# Patient Record
Sex: FEMALE | Race: WHITE | NOT HISPANIC OR LATINO | Employment: FULL TIME | URBAN - METROPOLITAN AREA
[De-identification: names, ages, dates, MRNs, and addresses within clinical notes are randomized per-mention and may not be internally consistent; named-entity substitution may affect disease eponyms.]

---

## 2017-01-31 ENCOUNTER — OFFICE VISIT (OUTPATIENT)
Dept: OBSTETRICS AND GYNECOLOGY | Facility: CLINIC | Age: 52
End: 2017-01-31

## 2017-01-31 VITALS
HEIGHT: 67 IN | SYSTOLIC BLOOD PRESSURE: 120 MMHG | BODY MASS INDEX: 43.35 KG/M2 | WEIGHT: 276.2 LBS | DIASTOLIC BLOOD PRESSURE: 82 MMHG

## 2017-01-31 DIAGNOSIS — N60.12 FIBROCYSTIC BREAST CHANGES, BILATERAL: Primary | ICD-10-CM

## 2017-01-31 DIAGNOSIS — N60.11 FIBROCYSTIC BREAST CHANGES, BILATERAL: Primary | ICD-10-CM

## 2017-01-31 DIAGNOSIS — E66.01 OBESITY, CLASS III, BMI 40-49.9 (MORBID OBESITY) (HCC): ICD-10-CM

## 2017-01-31 DIAGNOSIS — Z01.419 ENCOUNTER FOR GYNECOLOGICAL EXAMINATION WITHOUT ABNORMAL FINDING: ICD-10-CM

## 2017-01-31 PROBLEM — E66.813 OBESITY, CLASS III, BMI 40-49.9 (MORBID OBESITY): Status: ACTIVE | Noted: 2017-01-31

## 2017-01-31 PROCEDURE — 99396 PREV VISIT EST AGE 40-64: CPT | Performed by: OBSTETRICS & GYNECOLOGY

## 2017-01-31 RX ORDER — MONTELUKAST SODIUM 10 MG/1
1 TABLET ORAL DAILY
COMMUNITY
Start: 2017-01-06

## 2017-01-31 RX ORDER — OMEPRAZOLE 20 MG/1
20 CAPSULE, DELAYED RELEASE ORAL DAILY
COMMUNITY

## 2017-01-31 RX ORDER — HYDROCHLOROTHIAZIDE 12.5 MG/1
1 CAPSULE, GELATIN COATED ORAL DAILY
COMMUNITY
Start: 2016-12-19 | End: 2018-07-19 | Stop reason: DRUGHIGH

## 2017-01-31 NOTE — MR AVS SNAPSHOT
Anahy Osullivanby   2017 4:00 PM   Office Visit    Dept Phone:  [phone]   Encounter #:  [id number]    Provider:  Rob Fenton MD   Department:  [facility]                Your Full Care Plan              Your Updated Medication List          This list is accurate as of: 17  4:34 PM.  Always use your most recent med list.                hydrochlorothiazide 12.5 MG capsule   Commonly known as:  MICROZIDE       montelukast 10 MG tablet   Commonly known as:  SINGULAIR       MULTIVITAMIN ADULT PO       omeprazole 20 MG capsule   Commonly known as:  priLOSEC               We Performed the Following     Liquid-based Pap Smear, Screening       You Were Diagnosed With        Codes Comments    Fibrocystic breast changes, bilateral    -  Primary ICD-10-CM: N60.11, N60.12  ICD-9-CM: 610.1     Obesity, Class III, BMI 40-49.9 (morbid obesity)     ICD-10-CM: E66.01  ICD-9-CM: 278.01     Encounter for gynecological examination without abnormal finding     ICD-10-CM: Z01.419  ICD-9-CM: V72.31       Instructions     None    Patient Instructions History      Upcoming Appointments     Visit Type Date Time Department    ANNUAL 2017  4:00 PM [facility]      [employer] Signup     [facility] allows you to send messages to your doctor, view your test results, renew your prescriptions, schedule appointments, and more. To sign up, go to [employer] and click on the Sign Up Now link in the New User? box. Enter your [employer] Activation Code exactly as it appears below along with the last four digits of your Social Security Number and your Date of Birth () to complete the sign-up process. If you do not sign up before the expiration date, you must request a new code.    [employer] Activation Code: [email or web address]  Expires: 2017  4:34 PM    If you have questions, you can email [email or web address] or call  "[phone] to talk to our MyChart staff. Remember, MyChart is NOT to be used for urgent needs. For medical emergencies, dial 911.               Other Info from Your Visit           Your Appointments     Feb 06, 2018  4:00 PM EST   Annual with Rob Fenton MD   [facility] [place] (--)    [address]  [place] [facility] [address] [phone]              Allergies     Lortab [Hydrocodone-acetaminophen]  Nausea And Vomiting    Sulfa Antibiotics  Nausea And Vomiting      Reason for Visit     Gynecologic Exam           Vital Signs     Blood Pressure Height Weight Last Menstrual Period Body Mass Index Smoking Status    120/82 66.5\" (168.9 cm) 276 lb 3.2 oz (125 kg) 01/24/2017 (Exact Date) 43.91 kg/m2 Never Smoker      Problems and Diagnoses Noted     Painful lumpy breasts    High blood pressure    Obesity, Class III, BMI 40-49.9 (morbid obesity)    Seasonal allergic reaction    Encounter for routine gynecological examination            "

## 2017-01-31 NOTE — PROGRESS NOTES
"   Chief Complaint   Patient presents with   • Gynecologic Exam       Anahy Salinas is a 51 y.o. year old  presenting to be seen for her annual wellness exam.  She has been seen by Dr. De Guzman.  She has previously had a  section and tubal sterilization.  Prior to that she had a laparotomy with a uterine myomectomy.  She still has cyclic menses with variable flow.  She has no intermenstrual bleeding.  Rest imaging has been benign.    SCREENING TESTS    Year 2012   Age                         PAP    Neg.                     HPV high risk                         Mammogram    benign benign                    BONNY score                         Breast MRI                         Lipids                         Vitamin D                         Colonoscopy                         DEXA  Frax (hip/any)                         Ovarian Screen                           Enter the month test was performed.  If month not known, enter \"X'  · Black numbers = normal results  · Red numbers = abnormal results  · Black X = patient reported normal  · Red X - patient reported abnormal      Referred by:    Profession:    Other info:         History   Sexual Activity   • Sexual activity: Yes   • Partners: Male   • Birth control/ protection: Surgical    She would not like to be screened for STD's at today's exam.     She exercises regularly: no.  She wears her seat belt: yes.  She has concerns about domestic violence: no.  She has noticed changes in height: no    GYN screening history:  · Last pap: was done on approximately 8/15/2015 and the result was: normal PAP.  · Last mammogram: was done on approximately 2016 and the result was: Birads I (Normal)..    No Additional Complaints Reported    The following portions of the patient's history were reviewed and updated as appropriate:vital signs and   She  does not have any " "pertinent problems on file.  She  has a past surgical history that includes Reduction mammaplasty (Bilateral); Tubal ligation;  section; Myomectomy; Cervical polypectomy; Tonsillectomy; and Dilation and curettage of uterus.  Her family history includes Breast cancer (age of onset: 28) in her cousin. There is no history of Ovarian cancer.  She  reports that she has never smoked. She does not have any smokeless tobacco history on file. She reports that she does not drink alcohol or use illicit drugs.  Current Outpatient Prescriptions   Medication Sig Dispense Refill   • Multiple Vitamins-Minerals (MULTIVITAMIN ADULT PO) Take 1 tablet by mouth Daily.     • omeprazole (priLOSEC) 20 MG capsule Take 20 mg by mouth Daily.     • hydrochlorothiazide (MICROZIDE) 12.5 MG capsule Take 1 capsule by mouth Daily.     • montelukast (SINGULAIR) 10 MG tablet Take 1 tablet by mouth Daily.       No current facility-administered medications for this visit.      She is allergic to lortab [hydrocodone-acetaminophen] and sulfa antibiotics..    Review of Systems  A comprehensive review of systems was negative.  Constitutional: negative for fever, chills, activity change, appetite change, fatigue and unexpected weight change.  Respiratory: negative  Cardiovascular: negative  Gastrointestinal: negative  Genitourinary:negative  Musculoskeletal:negative  Behavioral/Psych: negative       Visit Vitals   • /82   • Ht 66.5\" (168.9 cm)   • Wt 276 lb 3.2 oz (125 kg)   • LMP 2017 (Exact Date)   • BMI 43.91 kg/m2       Physical Exam    General:  alert; cooperative; well developed; well nourished  obese - Body mass index is 43.91 kg/(m^2).   Skin:  No suspicious lesions seen   Thyroid: normal to inspection and palpation   Lungs:  clear to auscultation bilaterally   Heart:  regular rate and rhythm, S1, S2 normal, no murmur, click, rub or gallop   Breasts:  Examined in supine position  Symmetric without masses or skin dimpling  Nipples " normal without inversion, lesions or discharge  There are no palpable axillary nodes  Fibrocystic changes are present both breasts without a discrete mass  scars   Abdomen: soft, non-tender; no masses  no umbilical or inginual hernias are present  no hepato-splenomegaly   Pelvis: Clinical staff was present for exam  External genitalia:  normal appearance of the external genitalia including Bartholin's and [place]'s glands.  Vaginal:  normal pink mucosa without prolapse or lesions.  Cervix:  normal appearance.  Uterus:  normal size, shape and consistency. anteverted;  Adnexa:  non palpable bilaterally.  Rectal:  anus visually normal appearing. recto-vaginal exam unremarkable and confirms findings;     Lab Review   No data reviewed    Imaging  Mammogram results         ASSESSMENT  Problems Addressed this Visit        Digestive    Obesity, Class III, BMI 40-49.9 (morbid obesity)       Other    Fibrocystic breast changes, bilateral - Primary      Other Visit Diagnoses     Encounter for gynecological examination without abnormal finding        Relevant Orders    Liquid-based Pap Smear, Screening          PLAN    Medications prescribed this encounter:    New Medications Ordered This Visit   Medications   • hydrochlorothiazide (MICROZIDE) 12.5 MG capsule     Sig: Take 1 capsule by mouth Daily.   • montelukast (SINGULAIR) 10 MG tablet     Sig: Take 1 tablet by mouth Daily.   • Multiple Vitamins-Minerals (MULTIVITAMIN ADULT PO)     Sig: Take 1 tablet by mouth Daily.   • omeprazole (priLOSEC) 20 MG capsule     Sig: Take 20 mg by mouth Daily.   ·   · Calcium, 600 mg/ Vit. D, 400 IU daily; regular weight-bearing exercise  · Follow up: 12 month(s)  *Please note that portions of this documentation may have been completed with a voice recognition program.  Efforts were made to edit this dictation, but occasional words may have been mistranscribed.       This note was electronically signed.    BOLIVAR Fenton MD  January 31,  2017  4:29 PM

## 2017-07-06 ENCOUNTER — TRANSCRIBE ORDERS (OUTPATIENT)
Dept: OBSTETRICS AND GYNECOLOGY | Facility: CLINIC | Age: 52
End: 2017-07-06

## 2017-07-06 DIAGNOSIS — Z12.31 VISIT FOR SCREENING MAMMOGRAM: Primary | ICD-10-CM

## 2017-07-11 ENCOUNTER — APPOINTMENT (OUTPATIENT)
Dept: MAMMOGRAPHY | Facility: HOSPITAL | Age: 52
End: 2017-07-11
Attending: OBSTETRICS & GYNECOLOGY

## 2017-07-11 ENCOUNTER — HOSPITAL ENCOUNTER (OUTPATIENT)
Dept: MAMMOGRAPHY | Facility: HOSPITAL | Age: 52
Discharge: HOME | End: 2017-07-11
Attending: OBSTETRICS & GYNECOLOGY | Admitting: OBSTETRICS & GYNECOLOGY

## 2017-07-11 DIAGNOSIS — Z12.31 VISIT FOR SCREENING MAMMOGRAM: ICD-10-CM

## 2017-07-11 PROCEDURE — 77067 SCR MAMMO BI INCL CAD: CPT | Performed by: RADIOLOGY

## 2017-07-11 PROCEDURE — 77063 BREAST TOMOSYNTHESIS BI: CPT | Performed by: RADIOLOGY

## 2017-07-11 PROCEDURE — G0202 SCR MAMMO BI INCL CAD: HCPCS

## 2017-07-11 PROCEDURE — 77063 BREAST TOMOSYNTHESIS BI: CPT

## 2018-06-25 ENCOUNTER — TRANSCRIBE ORDERS (OUTPATIENT)
Dept: ADMINISTRATIVE | Facility: HOSPITAL | Age: 53
End: 2018-06-25

## 2018-06-25 DIAGNOSIS — Z12.31 VISIT FOR SCREENING MAMMOGRAM: Primary | ICD-10-CM

## 2018-07-12 ENCOUNTER — HOSPITAL ENCOUNTER (OUTPATIENT)
Dept: MAMMOGRAPHY | Facility: HOSPITAL | Age: 53
Discharge: HOME | End: 2018-07-12
Attending: OBSTETRICS & GYNECOLOGY | Admitting: OBSTETRICS & GYNECOLOGY

## 2018-07-12 DIAGNOSIS — Z12.31 VISIT FOR SCREENING MAMMOGRAM: ICD-10-CM

## 2018-07-12 PROBLEM — Z01.419 WELL WOMAN EXAM: Status: ACTIVE | Noted: 2018-07-12

## 2018-07-12 PROCEDURE — 77063 BREAST TOMOSYNTHESIS BI: CPT | Performed by: RADIOLOGY

## 2018-07-12 PROCEDURE — 77067 SCR MAMMO BI INCL CAD: CPT | Performed by: RADIOLOGY

## 2018-07-12 PROCEDURE — 77067 SCR MAMMO BI INCL CAD: CPT

## 2018-07-12 PROCEDURE — 77063 BREAST TOMOSYNTHESIS BI: CPT

## 2018-07-19 ENCOUNTER — OFFICE VISIT (OUTPATIENT)
Dept: OBSTETRICS AND GYNECOLOGY | Facility: CLINIC | Age: 53
End: 2018-07-19

## 2018-07-19 VITALS
WEIGHT: 278 LBS | SYSTOLIC BLOOD PRESSURE: 122 MMHG | DIASTOLIC BLOOD PRESSURE: 80 MMHG | RESPIRATION RATE: 14 BRPM | BODY MASS INDEX: 44.2 KG/M2

## 2018-07-19 DIAGNOSIS — Z01.419 WELL WOMAN EXAM: Primary | ICD-10-CM

## 2018-07-19 DIAGNOSIS — N95.0 POSTMENOPAUSAL BLEEDING: ICD-10-CM

## 2018-07-19 DIAGNOSIS — Z12.11 SCREEN FOR COLON CANCER: ICD-10-CM

## 2018-07-19 PROBLEM — R73.03 PRE-DIABETES: Status: ACTIVE | Noted: 2018-01-01

## 2018-07-19 PROCEDURE — 99396 PREV VISIT EST AGE 40-64: CPT | Performed by: OBSTETRICS & GYNECOLOGY

## 2018-07-19 RX ORDER — HYDROCHLOROTHIAZIDE 25 MG/1
TABLET ORAL
COMMUNITY

## 2018-07-19 NOTE — PROGRESS NOTES
Subjective   Chief Complaint   Patient presents with   • Gynecologic Exam     Anahy Schaffer is a 53 y.o. year old  presenting to be seen for her annual exam.     SEXUAL Hx:  She is currently sexually active.  In the past year there there has been NO new sexual partners.    Condoms are never used.  She would not like to be screened for STD's at today's exam.  Current birth control method: tubal ligation.  She is happy with her current method of contraception and does not want to discuss alternative methods of contraception.  MENSTRUAL Hx:  Patient's last menstrual period was 2018.  This was the first cycle in at least 6 months  In the past 6 months her cycles have been unpredictable infrequent.  Her menstrual flow is typically normal.     Intermenstrual bleeding is absent.    Post-coital bleeding is absent.  Dysmenorrhea: is not affecting her activities of daily living  PMS: is not affecting her activities of daily living  Her cycles are not a source of concern for her that she wishes to discuss today.  HEALTH Hx:  She exercises regularly: no (but is planning to start exercising more ).  She wears her seat belt: yes.  She has concerns about domestic violence: no.  OTHER THINGS SHE WANTS TO DISCUSS TODAY:  Nothing else    The following portions of the patient's history were reviewed and updated as appropriate:problem list, current medications, allergies, past family history, past medical history, past social history and past surgical history.    Smoking status: Never Smoker                                                                 Smokeless tobacco: Not on file                       Review of Systems  Constitutional POS: nothing reported    NEG: anorexia or night sweats   Genitourinary POS: nothing reported    NEG: dysuria or hematuria      Gastointestinal POS: nothing reported    NEG: bloating, change in bowel habits, melena or reflux symptoms   Integument POS: nothing reported    NEG: moles  that are changing in size, shape, color or rashes   Breast POS: nothing reported    NEG: persistent breast lump, skin dimpling or nipple discharge        Objective   /80   Resp 14   Wt 126 kg (278 lb)   LMP 06/19/2018   Breastfeeding? No   BMI 44.20 kg/m²     General:  well developed; well nourished  no acute distress   Skin:  No suspicious lesions seen   Thyroid: normal to inspection and palpation   Breasts:  Examined in supine position  Symmetric without masses or skin dimpling  Nipples normal without inversion, lesions or discharge  There are no palpable axillary nodes   Abdomen: soft, non-tender; no masses  no umbilical or inginual hernias are present  no hepato-splenomegaly   Pelvis: Exam limited by  body habitus  Clinical staff was present for exam  External genitalia:  normal appearance of the external genitalia including Bartholin's and [place]'s glands.  :  urethral meatus normal;  Vaginal:  normal pink mucosa without prolapse or lesions.  Cervix:  normal appearance.  Uterus:  normal size, shape and consistency.  Adnexa:  non palpable bilaterally.  Rectal:  digital rectal exam not performed; anus visually normal appearing.        Assessment   1. Normal GYN exam  2. Perimenopausal bleeding.  This is a new finding that does need to be worked up further  3. She is up to date on all relevant gynecologic and colorectal screenings except colonoscopy     Plan   1. Pap was not done today.  I explained to Anahy that the recommendations for Pap smear interval in a low risk patient has lengthened to 3 years time.  I told Anahy she still needs to be seen in our office yearly for a full physical including breast and pelvic exam.  2. She was encouraged to get yearly mammograms.  She should report any palpable breast lump(s) or skin changes regardless of mammographic findings.  I explained to Anahy that notification regarding her mammogram results will come from the center performing the study.  Our  office will not be routinely calling with mammogram results.  It is her responsibility to make sure that the results from the mammogram are communicated to her by the breast center.  If she has any questions about the results, she is welcome to call our office anytime.  3. Colonoscopy was recommended for screening for colon cancer.  The procedure was briefly discussed and its benefits for early detection of colon cancer were emphasized.  I explained to Anahy that we could help her to schedule it if she wishes.  Additionally, she could also contact her primary care physician to help make this arrangement.  After considering these options she wants help setting up her colonoscopy.  Referral will be made to Dr. Dowd for outpatient colonoscopy..  4. Ultrasound needs to be done any time that is convenient for her.   5. The importance of keeping all planned follow-up and taking all medications as prescribed was emphasized.  6. Follow up after ultrasound            This note was electronically signed.    Usama Mosqueda M.D.  July 19, 2018    Note: Speech recognition transcription software may have been used to create portions of this document.  An attempt at proofreading has been made but errors in transcription could still be present.

## 2018-07-31 ENCOUNTER — LAB REQUISITION (OUTPATIENT)
Dept: LAB | Facility: HOSPITAL | Age: 53
End: 2018-07-31

## 2018-07-31 DIAGNOSIS — Z12.11 ENCOUNTER FOR SCREENING FOR MALIGNANT NEOPLASM OF COLON: ICD-10-CM

## 2018-07-31 PROCEDURE — 88305 TISSUE EXAM BY PATHOLOGIST: CPT | Performed by: INTERNAL MEDICINE

## 2018-08-01 LAB
CYTO UR: NORMAL
LAB AP CASE REPORT: NORMAL
LAB AP CLINICAL INFORMATION: NORMAL
PATH REPORT.FINAL DX SPEC: NORMAL
PATH REPORT.GROSS SPEC: NORMAL

## 2019-07-22 NOTE — PROGRESS NOTES
Subjective   Chief Complaint   Patient presents with   • Gynecologic Exam     Anahy Schaffer is a 54 y.o. year old  menopausal female presenting to be seen for her annual exam.  When she was last here she had some unpredictable bleeding for which an ultrasound was done.  Results were normal and she was told to observe this.  It is been at least 6 months since her last episode of bleeding.    Last year colonoscopy was recommended.  She got it done at [facility].  It was done by Dr. Dowd and she had a hyperplastic colon polyp.  She believes 10-year follow-up was recommended.    This past year she has not been on hormone replacement therapy.  She has not had any vaginal bleeding in the last 6 months.  Menopausal symptoms are not present.    SEXUAL Hx:  She is currently sexually active.  In the past year there there has been NO new sexual partners.    Condoms are never used.  She would not like to be screened for STD's at today's exam.  [place] is painful: no  HEALTH Hx:  She exercises regularly: no (and has no plans to become more active).  She wears her seat belt: yes.  She has concerns about domestic violence: no.  She has noticed changes in height: no.  OTHER THINGS SHE WANTS TO DISCUSS TODAY:  Nothing else    The following portions of the patient's history were reviewed and updated as appropriate:problem list, current medications, allergies, past family history, past medical history, past social history and past surgical history.    Social History    Tobacco Use      Smoking status: Never Smoker      Smokeless tobacco: Never Used      Review of Systems  Constitutional POS: nothing reported    NEG: anorexia or night sweats   Genitourinary POS: nothing reported    NEG: dysuria or hematuria      Gastointestinal POS: nothing reported    NEG: bloating, change in bowel habits, melena or reflux symptoms   Integument POS: nothing reported    NEG: moles that are changing in size, shape, color or rashes   Breast  POS: nothing reported    NEG: persistent breast lump, skin dimpling or nipple discharge        Objective   There were no vitals taken for this visit.    General:  well developed; well nourished  no acute distress   Skin:  No suspicious lesions seen   Thyroid: normal to inspection and palpation   Breasts:  Examined in supine position  Symmetric without masses or skin dimpling  Nipples normal without inversion, lesions or discharge  There are no palpable axillary nodes   Abdomen: soft, non-tender; no masses  no umbilical or inguinal hernias are present  no hepato-splenomegaly   Pelvis: Clinical staff was present for exam  External genitalia:  normal appearance of the external genitalia including Bartholin's and [place]'s glands.  :  urethral meatus normal;  Vaginal:  normal pink mucosa without prolapse or lesions.  Cervix:  normal appearance.  Uterus:  normal size, shape and consistency.  Adnexa:  normal bimanual exam of the adnexa.  Rectal:  digital rectal exam not performed; anus visually normal appearing.        Assessment   1. Normal GYN exam  2. History of hyperplastic polyp  3. Menopausal female (versus perimenopause) currently not on HRT - without significant symptoms affecting activities of daily living  4. She is up to date on all relevant gynecologic and colorectal screenings     Plan   1. Pap was done today.  If she does not receive the results of the Pap within 2 weeks  time, she was instructed to call to find out the results.  I explained to nAahy that the recommendations for Pap smear interval in a low risk patient's has lengthened to 3 years time.  I encouraged her to be seen yearly for a full physical exam including breast and pelvic exam even during the off years when PAP's will not be performed.  2. She was encouraged to get yearly mammograms.  She should report any palpable breast lump(s) or skin changes regardless of mammographic findings.  I explained to Anahy that notification regarding her  mammogram results will come from the center performing the study.  Our office will not be routinely calling with mammogram results.  It is her responsibility to make sure that the results from the mammogram are communicated to her by the breast center.  If she has any questions about the results, she is welcome to call our office anytime.  3. The importance of keeping all planned follow-up and taking all medications as prescribed was emphasized.  4. Follow up for annual exam 1 year           This note was electronically signed.    Usama Mosqueda M.D.  July 25, 2019    Note: Speech recognition transcription software may have been used to create portions of this document.  An attempt at proofreading has been made but errors in transcription could still be present.

## 2019-07-25 ENCOUNTER — OFFICE VISIT (OUTPATIENT)
Dept: OBSTETRICS AND GYNECOLOGY | Facility: CLINIC | Age: 54
End: 2019-07-25

## 2019-07-25 ENCOUNTER — TRANSCRIBE ORDERS (OUTPATIENT)
Dept: ADMINISTRATIVE | Facility: HOSPITAL | Age: 54
End: 2019-07-25

## 2019-07-25 VITALS
RESPIRATION RATE: 14 BRPM | SYSTOLIC BLOOD PRESSURE: 122 MMHG | WEIGHT: 293 LBS | DIASTOLIC BLOOD PRESSURE: 74 MMHG | BODY MASS INDEX: 47.06 KG/M2

## 2019-07-25 DIAGNOSIS — Z12.31 VISIT FOR SCREENING MAMMOGRAM: Primary | ICD-10-CM

## 2019-07-25 DIAGNOSIS — Z01.419 WELL WOMAN EXAM: Primary | ICD-10-CM

## 2019-07-25 PROBLEM — K21.9 GERD (GASTROESOPHAGEAL REFLUX DISEASE): Status: ACTIVE | Noted: 2018-01-01

## 2019-07-25 PROCEDURE — 99396 PREV VISIT EST AGE 40-64: CPT | Performed by: OBSTETRICS & GYNECOLOGY

## 2019-07-25 RX ORDER — MELATONIN
1000 DAILY
COMMUNITY

## 2019-09-10 ENCOUNTER — HOSPITAL ENCOUNTER (OUTPATIENT)
Dept: MAMMOGRAPHY | Facility: HOSPITAL | Age: 54
Discharge: HOME | End: 2019-09-10
Admitting: OBSTETRICS & GYNECOLOGY

## 2019-09-10 DIAGNOSIS — Z12.31 VISIT FOR SCREENING MAMMOGRAM: ICD-10-CM

## 2019-09-10 PROCEDURE — 77067 SCR MAMMO BI INCL CAD: CPT

## 2019-09-10 PROCEDURE — 77063 BREAST TOMOSYNTHESIS BI: CPT | Performed by: RADIOLOGY

## 2019-09-10 PROCEDURE — 77067 SCR MAMMO BI INCL CAD: CPT | Performed by: RADIOLOGY

## 2019-09-10 PROCEDURE — 77063 BREAST TOMOSYNTHESIS BI: CPT

## 2019-09-13 ENCOUNTER — TRANSCRIBE ORDERS (OUTPATIENT)
Dept: MAMMOGRAPHY | Facility: HOSPITAL | Age: 54
End: 2019-09-13

## 2019-09-16 ENCOUNTER — HOSPITAL ENCOUNTER (OUTPATIENT)
Dept: MAMMOGRAPHY | Facility: HOSPITAL | Age: 54
Discharge: HOME | End: 2019-09-16

## 2019-09-16 DIAGNOSIS — Z12.31 VISIT FOR SCREENING MAMMOGRAM: ICD-10-CM

## 2020-07-26 NOTE — PROGRESS NOTES
Subjective   Chief Complaint   Patient presents with   • Gynecologic Exam     return annual, with c/o's night sweats     Anahy Schaffer is a 55 y.o. year old  menopausal female presenting to be seen for her annual exam.  This past year she has not been on hormone replacement therapy.  She has not had any vaginal bleeding in the last 12 months.  Menopausal symptoms are present (hot flashes) but not affecting her quality of life .  She has been taking a supplement containing black cohosh and it seems to be helping her symptoms nicely.  Hot flashes seem to coincide with her father's death.  Things are slowly improving.    Her son is going to be a freshman at [employer].    SEXUAL Hx:  She is currently sexually active.  In the past year there there has been NO new sexual partners.    Condoms are never used.  She would not like to be screened for STD's at today's exam.  [place] is painful: no  HEALTH Hx:  She exercises regularly: no (and has no plans to become more active).  She wears her seat belt: yes.  She has concerns about domestic violence: no.  She has noticed changes in height: no.  OTHER THINGS SHE WANTS TO DISCUSS TODAY:  Nothing else    The following portions of the patient's history were reviewed and updated as appropriate:problem list, current medications, allergies, past family history, past medical history, past social history and past surgical history.    Social History    Tobacco Use      Smoking status: Never Smoker      Smokeless tobacco: Never Used      Review of Systems  Constitutional POS: nothing reported    NEG: anorexia or night sweats   Genitourinary POS: PAULINA is present but it IS NOT effecting her ADL's    NEG: dysuria or hematuria      Gastointestinal POS: nothing reported    NEG: bloating, change in bowel habits, melena or reflux symptoms   Integument POS: nothing reported and she does not routinely see a dermatologist for screening skin exams    NEG: moles  that are changing in size, shape, color or rashes   Breast POS: nothing reported    NEG: persistent breast lump, skin dimpling or nipple discharge        Objective   /88   Wt 135 kg (297 lb)   Breastfeeding No   BMI 47.22 kg/m²     General:  well developed; well nourished  no acute distress   Skin:  No suspicious lesions seen   Thyroid: normal to inspection and palpation   Breasts:  Examined in supine position  Symmetric without masses or skin dimpling  Nipples normal without inversion, lesions or discharge  There are no palpable axillary nodes   Abdomen: soft, non-tender; no masses  no umbilical or inguinal hernias are present  no hepato-splenomegaly   Pelvis: Exam limited by  body habitus  Clinical staff was present for exam  External genitalia:  normal appearance of the external genitalia including Bartholin's and [place]'s glands.  :  urethral meatus normal;  Vaginal:  normal pink mucosa without prolapse or lesions.  Cervix:  normal appearance.  Uterus:  normal size, shape and consistency.  Adnexa:  non palpable bilaterally.  Rectal:  digital rectal exam not performed; anus visually normal appearing.        Assessment   1. Normal GYN exam in menopause  2. Menopausal female currently not on HRT - without significant symptoms affecting activities of daily living   3. Vasomotor symptoms which ARE NOT impacting her activities of daily living  4. Obesity  5. She is up to date on all relevant gynecologic and colorectal screenings     Plan   1. Pap was not done today.  I explained to Anahy that the recommendations for Pap smear interval in a low risk patient has lengthened to 3 years time.  I told Anahy she still needs to be seen in our office yearly for a full physical including breast and pelvic exam.  2. She was encouraged to get yearly mammograms.  She should report any palpable breast lump(s) or skin changes regardless of mammographic findings.  I explained to Anahy that notification regarding  her mammogram results will come from the center performing the study.  Our office will not be routinely calling with mammogram results.  It is her responsibility to make sure that the results from the mammogram are communicated to her by the breast center.  If she has any questions about the results, she is welcome to call our office anytime.  3. The importance of keeping all planned follow-up and taking all medications as prescribed was emphasized.  4. Follow up for annual exam 1 year         This note was electronically signed.    Usama Mosqueda M.D.  July 27, 2020    Note: Speech recognition transcription software may have been used to create portions of this document.  An attempt at proofreading has been made but errors in transcription could still be present.

## 2020-07-27 ENCOUNTER — OFFICE VISIT (OUTPATIENT)
Dept: OBSTETRICS AND GYNECOLOGY | Facility: CLINIC | Age: 55
End: 2020-07-27

## 2020-07-27 VITALS — SYSTOLIC BLOOD PRESSURE: 132 MMHG | WEIGHT: 293 LBS | BODY MASS INDEX: 47.22 KG/M2 | DIASTOLIC BLOOD PRESSURE: 88 MMHG

## 2020-07-27 DIAGNOSIS — Z01.419 WELL WOMAN EXAM: Primary | ICD-10-CM

## 2020-07-27 PROCEDURE — 99396 PREV VISIT EST AGE 40-64: CPT | Performed by: OBSTETRICS & GYNECOLOGY

## 2020-09-03 ENCOUNTER — TRANSCRIBE ORDERS (OUTPATIENT)
Dept: OBSTETRICS AND GYNECOLOGY | Facility: CLINIC | Age: 55
End: 2020-09-03

## 2020-09-03 DIAGNOSIS — Z12.31 VISIT FOR SCREENING MAMMOGRAM: Primary | ICD-10-CM

## 2020-09-11 ENCOUNTER — TRANSCRIBE ORDERS (OUTPATIENT)
Dept: ADMINISTRATIVE | Facility: HOSPITAL | Age: 55
End: 2020-09-11

## 2020-09-11 DIAGNOSIS — R79.89 ELEVATED LIVER FUNCTION TESTS: Primary | ICD-10-CM

## 2020-09-21 ENCOUNTER — HOSPITAL ENCOUNTER (OUTPATIENT)
Dept: ULTRASOUND IMAGING | Facility: HOSPITAL | Age: 55
Discharge: HOME | End: 2020-09-21
Admitting: INTERNAL MEDICINE

## 2020-09-21 DIAGNOSIS — R79.89 ELEVATED LIVER FUNCTION TESTS: ICD-10-CM

## 2020-09-21 PROCEDURE — 76705 ECHO EXAM OF ABDOMEN: CPT

## 2020-12-02 ENCOUNTER — HOSPITAL ENCOUNTER (OUTPATIENT)
Dept: MAMMOGRAPHY | Facility: HOSPITAL | Age: 55
Discharge: HOME | End: 2020-12-02
Admitting: OBSTETRICS & GYNECOLOGY

## 2020-12-02 DIAGNOSIS — Z12.31 VISIT FOR SCREENING MAMMOGRAM: ICD-10-CM

## 2020-12-02 PROCEDURE — 77063 BREAST TOMOSYNTHESIS BI: CPT | Performed by: RADIOLOGY

## 2020-12-02 PROCEDURE — 77063 BREAST TOMOSYNTHESIS BI: CPT

## 2020-12-02 PROCEDURE — 77067 SCR MAMMO BI INCL CAD: CPT | Performed by: RADIOLOGY

## 2020-12-02 PROCEDURE — 77067 SCR MAMMO BI INCL CAD: CPT

## 2021-02-25 ENCOUNTER — TRANSCRIBE ORDERS (OUTPATIENT)
Dept: ADMINISTRATIVE | Facility: HOSPITAL | Age: 56
End: 2021-02-25

## 2021-02-25 ENCOUNTER — HOSPITAL ENCOUNTER (OUTPATIENT)
Dept: GENERAL RADIOLOGY | Facility: HOSPITAL | Age: 56
Discharge: HOME | End: 2021-02-25
Admitting: NURSE PRACTITIONER

## 2021-02-25 DIAGNOSIS — M25.561 ACUTE PAIN OF RIGHT KNEE: Primary | ICD-10-CM

## 2021-02-25 DIAGNOSIS — M25.561 ACUTE PAIN OF RIGHT KNEE: ICD-10-CM

## 2021-02-25 PROCEDURE — 73562 X-RAY EXAM OF KNEE 3: CPT

## 2021-08-03 ENCOUNTER — OFFICE VISIT (OUTPATIENT)
Dept: OBSTETRICS AND GYNECOLOGY | Facility: CLINIC | Age: 56
End: 2021-08-03

## 2021-08-03 VITALS
DIASTOLIC BLOOD PRESSURE: 76 MMHG | SYSTOLIC BLOOD PRESSURE: 124 MMHG | WEIGHT: 291 LBS | RESPIRATION RATE: 14 BRPM | BODY MASS INDEX: 46.26 KG/M2

## 2021-08-03 DIAGNOSIS — Z01.419 WELL WOMAN EXAM: Primary | ICD-10-CM

## 2021-08-03 DIAGNOSIS — Z71.85 VACCINE COUNSELING: ICD-10-CM

## 2021-08-03 PROBLEM — E78.00 HIGH CHOLESTEROL: Status: ACTIVE | Noted: 2021-01-01

## 2021-08-03 PROCEDURE — 99396 PREV VISIT EST AGE 40-64: CPT | Performed by: OBSTETRICS & GYNECOLOGY

## 2021-08-03 RX ORDER — ATORVASTATIN CALCIUM 10 MG/1
10 TABLET, FILM COATED ORAL DAILY
COMMUNITY

## 2021-08-03 NOTE — PROGRESS NOTES
Subjective   Chief Complaint   Patient presents with   • Gynecologic Exam     Anahy Schaffer is a 56 y.o. year old  menopausal female presenting to be seen for her annual exam.      This past year she has not been on hormone replacement therapy.  She has not had any vaginal bleeding in the last 12 months.  Menopausal symptoms are not present.    SEXUAL Hx:  She is currently sexually active.  In the past year there there has been NO new sexual partners.    Condoms are never used.  She would not like to be screened for STD's at today's exam.  [place] is painful: no  HEALTH Hx:  She exercises regularly: no (and has no plans to become more active).  She wears her seat belt: yes.  She has concerns about domestic violence: no.  She has noticed changes in height: no.  OTHER THINGS SHE WANTS TO DISCUSS TODAY:  Nothing else    The following portions of the patient's history were reviewed and updated as appropriate:problem list, current medications, allergies, past family history, past medical history, past social history and past surgical history.    Social History    Tobacco Use      Smoking status: Never Smoker      Smokeless tobacco: Never Used      Review of Systems  Constitutional POS: nothing reported    NEG: anorexia or night sweats   Genitourinary POS: nothing reported    NEG: dysuria or hematuria      Gastointestinal POS: nothing reported    NEG: bloating, change in bowel habits, melena or reflux symptoms   Integument POS: nothing reported    NEG: moles that are changing in size, shape, color or rashes   Breast POS: nothing reported    NEG: persistent breast lump, skin dimpling or nipple discharge        Objective   /76   Resp 14   Wt 132 kg (291 lb)   Breastfeeding No   BMI 46.26 kg/m²     General:  well developed; well nourished  no acute distress   Skin:  No suspicious lesions seen   Thyroid: normal to inspection and palpation   Breasts:  Examined in supine position  Symmetric without  masses or skin dimpling  Nipples normal without inversion, lesions or discharge  There are no palpable axillary nodes   Abdomen: soft, non-tender; no masses  no umbilical or inguinal hernias are present  no hepato-splenomegaly   Pelvis: Exam limited by  body habitus  Clinical staff was present for exam  External genitalia:  normal appearance of the external genitalia including Bartholin's and [place]'s glands.  :  urethral meatus normal;  Vaginal:  normal pink mucosa without prolapse or lesions.  Cervix:  normal appearance.  Uterus:  normal size, shape and consistency.  Adnexa:  non palpable bilaterally.  Rectal:  digital rectal exam not performed; anus visually normal appearing.        Assessment   1. Normal GYN exam in menopause  2. Menopausal female currently not on HRT - without significant symptoms affecting activities of daily living  3. She is up to date on all relevant gynecologic and colorectal screenings     Plan   1. Pap was not done today.  I explained to Anahy that the recommendations for Pap smear interval in a low risk patient has lengthened to 3 years time.  I told Anahy she still needs to be seen in our office yearly for a full physical including breast and pelvic exam.  2. She was encouraged to get yearly mammograms.  She should report any palpable breast lump(s) or skin changes regardless of mammographic findings.  I explained to Anahy that notification regarding her mammogram results will come from the center performing the study.  Our office will not be routinely calling with mammogram results.  It is her responsibility to make sure that the results from the mammogram are communicated to her by the breast center.  If she has any questions about the results, she is welcome to call our office anytime.  3. I discussed with Anahy that she may be behind on needed vaccinations for Shingles [Shingrix].  She may be able to obtain these vaccinations at her local pharmacy OR speak about  obtaining them with her primary care.  If she does obtain her vaccines, I have asked Anahy to let us know the date each vaccine was obtained so that her medical record could be updated in our system.  4. The importance of keeping all planned follow-up and taking all medications as prescribed was emphasized.  5. Follow up for annual exam 1 year           This note was electronically signed.    Usama Mosqueda M.D.  August 3, 2021    Note: Speech recognition transcription software may have been used to create portions of this document.  An attempt at proofreading has been made but errors in transcription could still be present.

## 2021-08-03 NOTE — PATIENT INSTRUCTIONS
Zoster Vaccine, Recombinant injection (Shingrix)      What is this medicine?  ZOSTER VACCINE (ZOS ter vak SEEN) is used to prevent shingles in adults 50 years old and over. This vaccine is not used to treat shingles or nerve pain from shingles.  This medicine may be used for other purposes; ask your health care provider or pharmacist if you have questions.    What should I tell my health care provider before I take this medicine?  They need to know if you have any of these conditions:  • blood disorders or disease  • cancer like leukemia or lymphoma  • immune system problems or therapy  • an unusual or allergic reaction to vaccines, other medications, foods, dyes, or preservatives  • pregnant or trying to get pregnant  • breast-feeding    How should I use this medicine?  1. This vaccine is for injection in a muscle. It is given by a health care professional.  2. The vaccine series requires 2 doses for full effect  3. The second dose should be given somewhere between 2-6 months after the initial injection is given.    What if I miss a dose?  • Keep appointments for follow-up (booster) doses as directed. It is important not to miss your dose.   • Call your doctor or health care professional if you are unable to keep an appointment.    What may interact with this medicine?  • medicines that suppress your immune system  • medicines to treat cancer  • steroid medicines like prednisone or cortisone    This list may not describe all possible interactions. Give your health care provider a list of all the medicines, herbs, non-prescription drugs, or dietary supplements you use. Also tell them if you smoke, drink alcohol, or use illegal drugs. Some items may interact with your medicine.    What should I watch for while using this medicine?  • Visit your doctor for regular check ups.  • This vaccine, like all vaccines, may not fully protect everyone.    What side effects may I notice from receiving this medicine?  Side effects  that you should report to your doctor or health care professional as soon as possible:  • allergic reactions like skin rash, itching or hives, swelling of the face, lips, or tongue  • breathing problems  • Side effects that usually do not require medical attention (report these to your doctor or health care professional if they continue or are bothersome):  • chills  • headache  • fever  • nausea, vomiting  • redness, warmth, pain, swelling or itching at site where injected  • tiredness  This list may not describe all possible side effects. Call your doctor for medical advice about side effects. You may report side effects to FDA at 1-119-HAW-2574.    Where should I keep my medicine?  This vaccine is only given in a clinic, pharmacy, doctor's office, or other health care setting and will not be stored at home.  NOTE: This sheet is a summary. It may not cover all possible information. If you have questions about this medicine, talk to your doctor, pharmacist, or health care provider.  © 2019 [employer]vier/Gold Standard (2018-07-30 13:20:30)

## 2021-12-16 ENCOUNTER — TRANSCRIBE ORDERS (OUTPATIENT)
Dept: OBSTETRICS AND GYNECOLOGY | Facility: CLINIC | Age: 56
End: 2021-12-16

## 2021-12-16 DIAGNOSIS — Z12.31 VISIT FOR SCREENING MAMMOGRAM: Primary | ICD-10-CM

## 2022-01-26 ENCOUNTER — HOSPITAL ENCOUNTER (OUTPATIENT)
Dept: MAMMOGRAPHY | Facility: HOSPITAL | Age: 57
Discharge: HOME | End: 2022-01-26
Admitting: OBSTETRICS & GYNECOLOGY

## 2022-01-26 DIAGNOSIS — Z12.31 VISIT FOR SCREENING MAMMOGRAM: ICD-10-CM

## 2022-01-26 PROCEDURE — 77067 SCR MAMMO BI INCL CAD: CPT

## 2022-01-26 PROCEDURE — 77063 BREAST TOMOSYNTHESIS BI: CPT | Performed by: RADIOLOGY

## 2022-01-26 PROCEDURE — 77067 SCR MAMMO BI INCL CAD: CPT | Performed by: RADIOLOGY

## 2022-01-26 PROCEDURE — 77063 BREAST TOMOSYNTHESIS BI: CPT

## 2022-08-05 ENCOUNTER — TELEPHONE (OUTPATIENT)
Dept: OBSTETRICS AND GYNECOLOGY | Facility: CLINIC | Age: 57
End: 2022-08-05

## 2022-08-05 ENCOUNTER — OFFICE VISIT (OUTPATIENT)
Dept: OBSTETRICS AND GYNECOLOGY | Facility: CLINIC | Age: 57
End: 2022-08-05

## 2022-08-05 VITALS
WEIGHT: 276 LBS | BODY MASS INDEX: 43.88 KG/M2 | DIASTOLIC BLOOD PRESSURE: 78 MMHG | SYSTOLIC BLOOD PRESSURE: 128 MMHG | RESPIRATION RATE: 14 BRPM

## 2022-08-05 DIAGNOSIS — Z01.419 WELL WOMAN EXAM: Primary | ICD-10-CM

## 2022-08-05 DIAGNOSIS — Z71.85 VACCINE COUNSELING: ICD-10-CM

## 2022-08-05 PROBLEM — F41.9 ANXIETY: Status: ACTIVE | Noted: 2022-05-01

## 2022-08-05 PROCEDURE — 99396 PREV VISIT EST AGE 40-64: CPT | Performed by: OBSTETRICS & GYNECOLOGY

## 2022-08-05 NOTE — PROGRESS NOTES
Subjective   Chief Complaint   Patient presents with   • Gynecologic Exam     Anahy Schaffer is a 57 y.o. year old  menopausal female presenting to be seen for her annual exam.      This past year she has not been on hormone replacement therapy.  She has not had any vaginal bleeding in the last 12 months.  Menopausal symptoms are not present.    SEXUAL Hx:  She is currently sexually active.  In the past year there there has been NO new sexual partners.    Condoms are never used.  She would not like to be screened for STD's at today's exam.  [place] is painful: no  HEALTH Hx:  She exercises regularly: no (and has no plans to become more active).  She wears her seat belt: yes.  She has concerns about domestic violence: no.  She has noticed changes in height: no.  OTHER THINGS SHE WANTS TO DISCUSS TODAY:  Nothing else    The following portions of the patient's history were reviewed and updated as appropriate:problem list, current medications, allergies, past family history, past medical history, past social history and past surgical history.    Social History    Tobacco Use      Smoking status: Never Smoker      Smokeless tobacco: Never Used      Review of Systems  Constitutional POS: nothing reported    NEG: anorexia or night sweats   Genitourinary POS: nothing reported    NEG: dysuria or hematuria      Gastointestinal POS: nothing reported    NEG: bloating, change in bowel habits, melena or reflux symptoms   Integument POS: nothing reported    NEG: moles that are changing in size, shape, color or rashes   Breast POS: nothing reported    NEG: persistent breast lump, skin dimpling or nipple discharge        Objective   /78   Resp 14   Wt 125 kg (276 lb)   LMP 2018 (Approximate)   Breastfeeding No   BMI 43.88 kg/m²     General:  well developed; well nourished  no acute distress   Skin:  No suspicious lesions seen   Thyroid: normal to inspection and palpation   Breasts:  Examined in  supine position  Symmetric without masses or skin dimpling  Nipples normal without inversion, lesions or discharge  There are no palpable axillary nodes   Abdomen: soft, non-tender; no masses  no umbilical or inguinal hernias are present  no hepato-splenomegaly   Pelvis: Exam limited by  body habitus  Clinical staff was present for exam  External genitalia:  normal appearance of the external genitalia including Bartholin's and [place]'s glands.  :  urethral meatus normal;  Vaginal:  normal pink mucosa without prolapse or lesions.  Cervix:  normal appearance.  Uterus:  normal size, shape and consistency.  Adnexa:  non palpable bilaterally.  Rectal:  digital rectal exam not performed; anus visually normal appearing.        Assessment   1. Normal GYN exam in menopause  2. Menopausal female currently not on HRT - without significant symptoms affecting activities of daily living  3. She is up to date on all relevant gynecologic and colorectal screenings     Plan   1. Pap was done today.  If she does not receive the results of the Pap within 2 weeks  time, she was instructed to call to find out the results.  I explained to Anahy that the recommendations for Pap smear interval in a low risk patient's has lengthened to 3 years time.  I encouraged her to be seen yearly for a full physical exam including breast and pelvic exam even during the off years when PAP's will not be performed.  2. She was encouraged to get yearly mammograms.  She should report any palpable breast lump(s) or skin changes regardless of mammographic findings.  I explained to Anahy that notification regarding her mammogram results will come from the center performing the study.  Our office will not be routinely calling with mammogram results.  It is her responsibility to make sure that the results from the mammogram are communicated to her by the breast center.  If she has any questions about the results, she is welcome to call our office  anytime.  3. Her vaccine record was reviewed and updated.  4. I discussed with Anahy that she may be behind on needed vaccinations for Shingles [Shingrix].  She may be able to obtain these vaccinations at her local pharmacy OR speak about obtaining them with her primary care.  If she does obtain her vaccines, I have asked Anahy to let us know the date each vaccine was obtained so that her medical record could be updated in our system.  5. The importance of keeping all planned follow-up and taking all medications as prescribed was emphasized.  6. Follow up for annual exam 1 year         This note was electronically signed.    Usama Mosqueda M.D.  August 5, 2022    Part of this note may be an electronic transcription/translation of spoken language to printed text using the Dragon Dictation System.

## 2022-08-05 NOTE — TELEPHONE ENCOUNTER
VIKASH    Pt was seen this morning.Pt was just calling to let Provider know the dates of her Shingles Vaccine 1st Dose - 10/13/21  2nd Dose- 12/18/21

## 2022-08-09 LAB — REF LAB TEST METHOD: NORMAL

## 2022-12-09 ENCOUNTER — OFFICE VISIT (OUTPATIENT)
Dept: NEUROSURGERY | Facility: CLINIC | Age: 57
End: 2022-12-09

## 2022-12-09 ENCOUNTER — HOSPITAL ENCOUNTER (OUTPATIENT)
Dept: GENERAL RADIOLOGY | Facility: HOSPITAL | Age: 57
Discharge: HOME | End: 2022-12-09
Admitting: NEUROLOGICAL SURGERY

## 2022-12-09 VITALS — HEIGHT: 67 IN | WEIGHT: 269 LBS | TEMPERATURE: 98.5 F | BODY MASS INDEX: 42.22 KG/M2

## 2022-12-09 DIAGNOSIS — M43.16 SPONDYLOLISTHESIS OF LUMBAR REGION: ICD-10-CM

## 2022-12-09 DIAGNOSIS — M51.16 LUMBAR DISC HERNIATION WITH RADICULOPATHY: Primary | ICD-10-CM

## 2022-12-09 DIAGNOSIS — E66.01 MORBID OBESITY: ICD-10-CM

## 2022-12-09 DIAGNOSIS — M51.36 DDD (DEGENERATIVE DISC DISEASE), LUMBAR: ICD-10-CM

## 2022-12-09 PROCEDURE — 72120 X-RAY BEND ONLY L-S SPINE: CPT

## 2022-12-09 PROCEDURE — 99204 OFFICE O/P NEW MOD 45 MIN: CPT | Performed by: NEUROLOGICAL SURGERY

## 2022-12-09 RX ORDER — PREDNISONE 10 MG/1
TABLET ORAL
COMMUNITY
Start: 2022-12-05 | End: 2023-01-11

## 2022-12-09 RX ORDER — GABAPENTIN 300 MG/1
CAPSULE ORAL
Qty: 90 CAPSULE | Refills: 1 | Status: SHIPPED | OUTPATIENT
Start: 2022-12-09 | End: 2023-01-11 | Stop reason: SDUPTHER

## 2022-12-09 RX ORDER — CHLORAL HYDRATE 500 MG
CAPSULE ORAL
COMMUNITY
Start: 2021-12-09

## 2022-12-09 RX ORDER — LEVOCETIRIZINE DIHYDROCHLORIDE 5 MG/1
TABLET, FILM COATED ORAL DAILY
COMMUNITY
Start: 2021-12-09 | End: 2023-02-21

## 2022-12-09 RX ORDER — FLUTICASONE PROPIONATE 50 MCG
SPRAY, SUSPENSION (ML) NASAL
COMMUNITY
Start: 2022-07-25

## 2022-12-09 RX ORDER — DOXYCYCLINE HYCLATE 100 MG/1
100 CAPSULE ORAL 2 TIMES DAILY
COMMUNITY
Start: 2022-12-05 | End: 2023-02-21

## 2022-12-09 RX ORDER — GABAPENTIN 100 MG/1
100 CAPSULE ORAL DAILY
COMMUNITY
Start: 2022-12-02 | End: 2023-01-11

## 2022-12-09 NOTE — PROGRESS NOTES
Patient: Anahy Schaffer  : 1965    Primary Care Provider: Muna Ozuna APRN    Requesting Provider: As above        History    Chief Complaint: Low back and left thigh pain and numbness.    History of Present Illness: Ms. Avalos is a 57-year-old woman who works for the [employer].  On 2022 she awoke with bothersome pain in her left back that precipitated an emergency room visit after a long drive.  She has pain in the left hip and lateral inguinal region.  She has some pain, tingling, numbness in the left lateral thigh.  The symptoms may vaguely extend into the proximal lateral calf.  He has no right lower extremity symptoms.  A low-dose of gabapentin and hot showers have been helpful.  She is been on the gabapentin for about a week.  She is worse with movement and certain activities.  She has a strong family history of back problems.  In retrospect, she feels that she has had some low-grade intermittent symptoms for about a year.  She denies bowel or bladder dysfunction.  She is slowly working on weight loss and has lost 40 pounds over the last 2 years.    Review of Systems   Constitutional: Positive for activity change. Negative for appetite change, chills, diaphoresis, fatigue, fever and unexpected weight change.   HENT: Negative for congestion, dental problem, drooling, ear discharge, ear pain, facial swelling, hearing loss, mouth sores, nosebleeds, postnasal drip, rhinorrhea, sinus pressure, sinus pain, sneezing, sore throat, tinnitus, trouble swallowing and voice change.    Eyes: Negative for photophobia, pain, discharge, redness, itching and visual disturbance.   Respiratory: Negative for apnea, cough, choking, chest tightness, shortness of breath, wheezing and stridor.    Cardiovascular: Negative for chest pain, palpitations and leg swelling.   Gastrointestinal: Negative for abdominal distention, abdominal pain, anal bleeding, blood in stool,  "constipation, diarrhea, nausea, rectal pain and vomiting.   Endocrine: Negative for cold intolerance, heat intolerance, polydipsia, polyphagia and polyuria.   Genitourinary: Negative for decreased urine volume, difficulty urinating, dyspareunia, dysuria, enuresis, flank pain, frequency, genital sores, hematuria, menstrual problem, pelvic pain, urgency, vaginal bleeding, vaginal discharge and vaginal pain.   Musculoskeletal: Positive for back pain. Negative for arthralgias, gait problem, joint swelling, myalgias, neck pain and neck stiffness.   Skin: Negative for color change, pallor, rash and wound.   Allergic/Immunologic: Positive for environmental allergies. Negative for food allergies and immunocompromised state.   Neurological: Positive for numbness. Negative for dizziness, tremors, seizures, syncope, facial asymmetry, speech difficulty, weakness, light-headedness and headaches.   Hematological: Negative for adenopathy. Does not bruise/bleed easily.   Psychiatric/Behavioral: Negative for agitation, behavioral problems, confusion, decreased concentration, dysphoric mood, hallucinations, self-injury, sleep disturbance and suicidal ideas. The patient is not nervous/anxious and is not hyperactive.        The patient's past medical history, past surgical history, family history, and social history have been reviewed at length in the electronic medical record.      Physical Exam:   Temp 98.5 °F (36.9 °C)   Ht 168.9 cm (66.5\")   Wt 122 kg (269 lb)   LMP 12/25/2018 (Approximate)   BMI 42.77 kg/m²   CONSTITUTIONAL: Patient is well-nourished, pleasant and appears stated age.  MUSCULOSKELETAL:  Straight leg raising is negative.  Lul's Sign is negative.  ROM in the low back is normal.  Tenderness in the back to palpation is not observed.  NEUROLOGICAL:  Orientation, memory, attention span, language function, and cognition have been examined and are intact.  Strength is intact in the lower extremities to direct " testing.  Muscle tone is normal throughout.  Station and gait are normal.  Sensation is altered to light touch testing in the left lateral thigh.  Deep tendon reflexes are 2+ and symmetrical.  Coordination is intact.      Medical Decision Making    Data Review:   (All imaging studies were personally reviewed unless stated otherwise)  MRI of the lumbar spine dated 11/29/2022 demonstrates some modest diffuse degenerative disc disease.  There is a low-grade listhesis of L3 on L4.  There is diffuse facet arthropathy.  There is soft disc herniation on the left that sits behind the L2 vertebral body.  My suspicion is that it ascends from L2-3 and not down from L1-2 although that is possible.    Diagnosis:   Left L2-3 disc herniation with radiculopathy.    Treatment Options:   I have increased her Neurontin to 300 mg 3 times a day.  I have referred her for physical therapy.  Prior to follow-up with me in several weeks I am going to check flexion-extension upright lateral lumbar spine x-rays to assess for instability.  If she continues to struggle then we may need to consider lumbar discectomy.       Diagnosis Plan   1. Lumbar disc herniation with radiculopathy        2. Spondylolisthesis of lumbar region        3. DDD (degenerative disc disease), lumbar        4. Morbid obesity (HCC)            Scribed for Eloy Villa MD by DIANA Shannon 12/9/2022 10:22 EST      I, Dr. Villa, personally performed the services described in the documentation, as scribed in my presence, and it is both accurate and complete.

## 2022-12-28 ENCOUNTER — TRANSCRIBE ORDERS (OUTPATIENT)
Dept: ADMINISTRATIVE | Facility: HOSPITAL | Age: 57
End: 2022-12-28
Payer: COMMERCIAL

## 2022-12-28 DIAGNOSIS — Z12.31 VISIT FOR SCREENING MAMMOGRAM: Primary | ICD-10-CM

## 2023-01-11 ENCOUNTER — OFFICE VISIT (OUTPATIENT)
Dept: NEUROSURGERY | Facility: CLINIC | Age: 58
End: 2023-01-11
Payer: COMMERCIAL

## 2023-01-11 VITALS — WEIGHT: 280 LBS | HEIGHT: 67 IN | BODY MASS INDEX: 43.95 KG/M2

## 2023-01-11 DIAGNOSIS — M51.36 DDD (DEGENERATIVE DISC DISEASE), LUMBAR: ICD-10-CM

## 2023-01-11 DIAGNOSIS — M51.16 LUMBAR DISC HERNIATION WITH RADICULOPATHY: Primary | ICD-10-CM

## 2023-01-11 DIAGNOSIS — M43.16 SPONDYLOLISTHESIS OF LUMBAR REGION: ICD-10-CM

## 2023-01-11 PROBLEM — J32.9 SINUSITIS, BACTERIAL: Status: ACTIVE | Noted: 2023-01-11

## 2023-01-11 PROBLEM — F41.1 ANXIETY, GENERALIZED: Status: ACTIVE | Noted: 2023-01-11

## 2023-01-11 PROBLEM — K76.0 FATTY LIVER: Status: ACTIVE | Noted: 2023-01-11

## 2023-01-11 PROBLEM — R53.81 MALAISE AND FATIGUE: Status: ACTIVE | Noted: 2023-01-11

## 2023-01-11 PROBLEM — B96.89 SINUSITIS, BACTERIAL: Status: ACTIVE | Noted: 2023-01-11

## 2023-01-11 PROBLEM — Z23 NEED FOR IMMUNIZATION AGAINST INFLUENZA: Status: ACTIVE | Noted: 2023-01-11

## 2023-01-11 PROBLEM — M51.26 LUMBAR HERNIATED DISC: Status: ACTIVE | Noted: 2023-01-11

## 2023-01-11 PROBLEM — R73.01 IFG (IMPAIRED FASTING GLUCOSE): Status: ACTIVE | Noted: 2023-01-11

## 2023-01-11 PROBLEM — R73.09 ABNORMAL GLUCOSE: Status: ACTIVE | Noted: 2023-01-11

## 2023-01-11 PROBLEM — R05.9 COUGH: Status: ACTIVE | Noted: 2023-01-11

## 2023-01-11 PROBLEM — R53.83 MALAISE AND FATIGUE: Status: ACTIVE | Noted: 2023-01-11

## 2023-01-11 PROBLEM — J30.9 ALLERGIC RHINITIS, UNSPECIFIED ALLERGIC RHINITIS TYPE: Status: ACTIVE | Noted: 2023-01-11

## 2023-01-11 PROBLEM — M25.561 ACUTE PAIN OF RIGHT KNEE: Status: ACTIVE | Noted: 2023-01-11

## 2023-01-11 PROBLEM — J01.00 ACUTE MAXILLARY SINUSITIS: Status: ACTIVE | Noted: 2023-01-11

## 2023-01-11 PROBLEM — Z23 NEED FOR VACCINATION WITH COMBINED DIPHTHERIA-TETANUS-PERTUSSIS (DTAP): Status: ACTIVE | Noted: 2023-01-11

## 2023-01-11 PROBLEM — J40 BRONCHITIS: Status: ACTIVE | Noted: 2020-01-07

## 2023-01-11 PROBLEM — N39.0 UTI (URINARY TRACT INFECTION): Status: ACTIVE | Noted: 2023-01-11

## 2023-01-11 PROBLEM — R74.8 ELEVATED LIVER ENZYMES: Status: ACTIVE | Noted: 2023-01-11

## 2023-01-11 PROBLEM — J06.9 URI (UPPER RESPIRATORY INFECTION): Status: ACTIVE | Noted: 2023-01-11

## 2023-01-11 PROCEDURE — 99213 OFFICE O/P EST LOW 20 MIN: CPT | Performed by: NEUROLOGICAL SURGERY

## 2023-01-11 RX ORDER — GABAPENTIN 300 MG/1
300 CAPSULE ORAL 3 TIMES DAILY
Qty: 90 CAPSULE | Refills: 0 | Status: SHIPPED | OUTPATIENT
Start: 2023-01-11 | End: 2023-02-13 | Stop reason: SDUPTHER

## 2023-01-11 NOTE — PROGRESS NOTES
Patient: Anahy Schaffer  : 1965    Primary Care Provider: Muna Ozuna APRN    Requesting Provider: As above        History    Chief Complaint: Low back and left thigh pain with numbness.    History of Present Illness: Ms. Avalos is a 57-year-old woman who works for the [employer].  On 2022 she awoke with bothersome pain in her left back that precipitated an emergency room visit after a long drive.  She has pain in the left hip and lateral inguinal region.  She has had some pain, tingling, numbness in the left lateral thigh.    She has no right lower extremity symptoms.  When seen last I increased her gabapentin to 300 mg 3 times a day.  She is worse with movement and certain activities.  She has a strong family history of back problems.  In retrospect, she feels that she has had some low-grade intermittent symptoms for about a year.  She denies bowel or bladder dysfunction.  She is slowly working on weight loss and has lost 40 pounds over the last 2 years.  He has been doing physical therapy.  At this point she has minimal symptoms in her thigh.  She still has a little bit of pain in her back.    Review of Systems   Constitutional: Negative for activity change, appetite change, chills, diaphoresis, fatigue, fever and unexpected weight change.   HENT: Negative for congestion, dental problem, drooling, ear discharge, ear pain, facial swelling, hearing loss, mouth sores, nosebleeds, postnasal drip, rhinorrhea, sinus pressure, sneezing, sore throat, tinnitus, trouble swallowing and voice change.    Eyes: Negative for photophobia, pain, discharge, redness, itching and visual disturbance.   Respiratory: Negative for apnea, cough, choking, chest tightness, shortness of breath, wheezing and stridor.    Cardiovascular: Negative for chest pain, palpitations and leg swelling.   Gastrointestinal: Negative for abdominal distention, abdominal pain, anal bleeding, blood in  "stool, constipation, diarrhea, nausea, rectal pain and vomiting.   Endocrine: Negative for cold intolerance, heat intolerance, polydipsia, polyphagia and polyuria.   Genitourinary: Negative for decreased urine volume, difficulty urinating, dysuria, enuresis, flank pain, frequency, genital sores, hematuria and urgency.   Musculoskeletal: Positive for back pain. Negative for arthralgias, gait problem, joint swelling, myalgias, neck pain and neck stiffness.   Skin: Negative for color change, pallor, rash and wound.   Allergic/Immunologic: Negative for environmental allergies, food allergies and immunocompromised state.   Neurological: Negative for dizziness, tremors, seizures, syncope, facial asymmetry, speech difficulty, weakness, light-headedness, numbness and headaches.   Hematological: Negative for adenopathy. Does not bruise/bleed easily.   Psychiatric/Behavioral: Negative for agitation, behavioral problems, confusion, decreased concentration, dysphoric mood, hallucinations, self-injury, sleep disturbance and suicidal ideas. The patient is not nervous/anxious and is not hyperactive.    All other systems reviewed and are negative.      The patient's past medical history, past surgical history, family history, and social history have been reviewed at length in the electronic medical record.      Physical Exam:   Ht 168.9 cm (66.5\")   Wt 127 kg (280 lb)   LMP 12/25/2018 (Approximate)   BMI 44.52 kg/m²   Deferred    Medical Decision Making    Data Review:   (All imaging studies were personally reviewed unless stated otherwise)  MRI of the lumbar spine dated 11/29/2022 demonstrates some modest diffuse degenerative disc disease.  There is a low-grade listhesis of L3 on L4.  There is diffuse facet arthropathy.  There is soft disc herniation on the left that sits behind the L2 vertebral body.  My suspicion is that it ascends from L2-3 and not down from L1-2 although that is possible.    Plain flexion-extension films " demonstrate a very subtle listhesis of L2 on L3 and L3 on L4 with the listhesis of L4 and L5 that moves about 3 mm or so from flexion to extension.    Diagnosis:   Left L2-3 disc herniation with radiculopathy, improving.  Lumbar spondylolisthesis at multiple levels with mild instability at L4-5.    Treatment Options:   The patient is improving and I have asked that she continue her gabapentin and her therapy 2 times a week for 3 more weeks.  She will then pursue her stretching and exercise regimen on her own.  She will follow-up in our clinic in about 6 weeks.  If she is doing better then we will very slowly wean down on the gabapentin.       Diagnosis Plan   1. Lumbar disc herniation with radiculopathy        2. Spondylolisthesis of lumbar region        3. DDD (degenerative disc disease), lumbar            Scribed for Eloy Villa MD by DIANA Shannon 1/11/2023 16:23 EST      I, Dr. Villa, personally performed the services described in the documentation, as scribed in my presence, and it is both accurate and complete.

## 2023-02-13 DIAGNOSIS — M51.16 LUMBAR DISC HERNIATION WITH RADICULOPATHY: ICD-10-CM

## 2023-02-13 RX ORDER — GABAPENTIN 300 MG/1
300 CAPSULE ORAL 3 TIMES DAILY
Qty: 90 CAPSULE | Refills: 0 | Status: SHIPPED | OUTPATIENT
Start: 2023-02-13 | End: 2023-03-23 | Stop reason: SDUPTHER

## 2023-02-13 NOTE — TELEPHONE ENCOUNTER
Provider:  Allen  Surgery/Procedure:  minerva  Surgery/Procedure Date:  na  Last visit:   1-11-23  Next visit:  2-21-23     Reason for call:  Patient is requesting a refill for Gabapentin. Please Advise. Thank you.    JHOAN:11/25/2022 Tramadol Hcl 50MG 1965 12 6 [facility] [place] [facility]  12/02/2022 Gabapentin 100MG 1965 60 30 Marek Jordan [facility] [place] 1  12/11/2022 Gabapentin 300MG 1965 90 30 Eloy Villa [facility] 1  01/11/2023 Gabapentin 300MG 1965 90 30 AllenEloy schofield [facility] 1    Requested Prescriptions     Pending Prescriptions Disp Refills   • gabapentin (NEURONTIN) 300 MG capsule 90 capsule 0     Sig: Take 1 capsule by mouth 3 (Three) Times a Day. 1 nightly for 3 days, 1 twice a day for 3 days, 1 three times a day thereafter

## 2023-02-14 NOTE — TELEPHONE ENCOUNTER
Called pt to advise, no answer. Left VM stating prescription has been sent to pharmacy.    HUB OK TO RELAY MESSAGE

## 2023-02-17 ENCOUNTER — HOSPITAL ENCOUNTER (OUTPATIENT)
Dept: MAMMOGRAPHY | Facility: HOSPITAL | Age: 58
Discharge: HOME | End: 2023-02-17
Admitting: OBSTETRICS & GYNECOLOGY
Payer: COMMERCIAL

## 2023-02-17 DIAGNOSIS — Z12.31 VISIT FOR SCREENING MAMMOGRAM: ICD-10-CM

## 2023-02-17 PROCEDURE — 77067 SCR MAMMO BI INCL CAD: CPT | Performed by: RADIOLOGY

## 2023-02-17 PROCEDURE — 77067 SCR MAMMO BI INCL CAD: CPT

## 2023-02-17 PROCEDURE — 77063 BREAST TOMOSYNTHESIS BI: CPT | Performed by: RADIOLOGY

## 2023-02-17 PROCEDURE — 77063 BREAST TOMOSYNTHESIS BI: CPT

## 2023-02-21 ENCOUNTER — OFFICE VISIT (OUTPATIENT)
Dept: NEUROSURGERY | Facility: CLINIC | Age: 58
End: 2023-02-21
Payer: COMMERCIAL

## 2023-02-21 VITALS — WEIGHT: 286.6 LBS | TEMPERATURE: 96.8 F | HEIGHT: 66 IN | BODY MASS INDEX: 46.06 KG/M2

## 2023-02-21 DIAGNOSIS — M51.16 LUMBAR DISC HERNIATION WITH RADICULOPATHY: Primary | ICD-10-CM

## 2023-02-21 DIAGNOSIS — M43.16 SPONDYLOLISTHESIS OF LUMBAR REGION: ICD-10-CM

## 2023-02-21 DIAGNOSIS — M51.36 DDD (DEGENERATIVE DISC DISEASE), LUMBAR: ICD-10-CM

## 2023-02-21 PROCEDURE — 99212 OFFICE O/P EST SF 10 MIN: CPT

## 2023-02-21 RX ORDER — CETIRIZINE HYDROCHLORIDE 10 MG/1
1 TABLET ORAL DAILY
COMMUNITY
Start: 2023-01-12
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
## 2023-03-23 DIAGNOSIS — M51.16 LUMBAR DISC HERNIATION WITH RADICULOPATHY: ICD-10-CM

## 2023-03-23 RX ORDER — GABAPENTIN 300 MG/1
300 CAPSULE ORAL 2 TIMES DAILY
Qty: 60 CAPSULE | Refills: 0 | Status: SHIPPED | OUTPATIENT
Start: 2023-03-23

## 2023-03-23 NOTE — TELEPHONE ENCOUNTER
Provider:  Allen  Surgery/Procedure:  JAMES  Surgery/Procedure Date:    Last visit:   2/21/23  Next visit: 4/7/23     Reason for call: Patient called for gabapentin refill. She is now taking 300mg BID, will adjust RX and pend for approval. At last visit we were working on weaning her from TID to BID with the goal of stopping completely.    David is unavailable.

## 2023-04-07 ENCOUNTER — OFFICE VISIT (OUTPATIENT)
Dept: NEUROSURGERY | Facility: CLINIC | Age: 58
End: 2023-04-07
Payer: COMMERCIAL

## 2023-04-07 VITALS — WEIGHT: 286 LBS | TEMPERATURE: 96.9 F | HEIGHT: 67 IN | BODY MASS INDEX: 44.89 KG/M2

## 2023-04-07 DIAGNOSIS — M43.16 SPONDYLOLISTHESIS OF LUMBAR REGION: ICD-10-CM

## 2023-04-07 DIAGNOSIS — M51.16 LUMBAR DISC HERNIATION WITH RADICULOPATHY: Primary | ICD-10-CM

## 2023-04-07 DIAGNOSIS — E66.01 MORBID OBESITY: ICD-10-CM

## 2023-04-07 PROCEDURE — 99213 OFFICE O/P EST LOW 20 MIN: CPT | Performed by: NEUROLOGICAL SURGERY

## 2023-04-07 NOTE — PROGRESS NOTES
Patient: Anahy Schaffer  : 1965    Primary Care Provider: Muna Ozuna APRN    Requesting Provider: As above        History    Chief Complaint: Low back and left thigh pain with numbness.    History of Present Illness: Ms. Avalos is a 57-year-old woman who works for the [employer].  On 2022 she awoke with bothersome pain in her left back that precipitated an emergency room visit after a long drive.  She has pain in the left hip and lateral inguinal region.  She has had some pain, tingling, numbness in the left lateral thigh. She has a strong family history of back problems.  In retrospect, she feels that she has had some low-grade intermittent symptoms for about a year.  She denies bowel or bladder dysfunction.  She is slowly working on weight loss and has lost 40 pounds over the last 2 years.  He has done physical therapy.  We have treated her with gabapentin and at her last visit she decreased that to twice a day.  She is not really having leg symptoms.  Occasionally she has a little bit of low back pain.  She recently did a driving trip to [place], [place] and tolerated that fairly well.    Review of Systems   Constitutional: Negative for activity change, appetite change, chills, diaphoresis, fatigue, fever and unexpected weight change.   HENT: Negative for congestion, dental problem, drooling, ear discharge, ear pain, facial swelling, hearing loss, mouth sores, nosebleeds, postnasal drip, rhinorrhea, sinus pressure, sinus pain, sneezing, sore throat, tinnitus, trouble swallowing and voice change.    Eyes: Negative for photophobia, pain, discharge, redness, itching and visual disturbance.   Respiratory: Negative for apnea, cough, choking, chest tightness, shortness of breath, wheezing and stridor.    Cardiovascular: Negative for chest pain, palpitations and leg swelling.   Gastrointestinal: Negative for abdominal distention, abdominal pain, anal  "bleeding, blood in stool, constipation, diarrhea, nausea, rectal pain and vomiting.   Endocrine: Negative for cold intolerance, heat intolerance, polydipsia, polyphagia and polyuria.   Genitourinary: Negative for decreased urine volume, difficulty urinating, dyspareunia, dysuria, enuresis, flank pain, frequency, genital sores, hematuria, menstrual problem, pelvic pain, urgency, vaginal bleeding, vaginal discharge and vaginal pain.   Musculoskeletal: Negative for arthralgias, back pain, gait problem, joint swelling, myalgias, neck pain and neck stiffness.   Skin: Negative for color change, pallor, rash and wound.   Allergic/Immunologic: Negative for environmental allergies, food allergies and immunocompromised state.   Neurological: Negative for dizziness, tremors, seizures, syncope, facial asymmetry, speech difficulty, weakness, light-headedness, numbness and headaches.   Hematological: Negative for adenopathy. Does not bruise/bleed easily.   Psychiatric/Behavioral: Negative for agitation, behavioral problems, confusion, decreased concentration, dysphoric mood, hallucinations, self-injury, sleep disturbance and suicidal ideas. The patient is not nervous/anxious and is not hyperactive.        The patient's past medical history, past surgical history, family history, and social history have been reviewed at length in the electronic medical record.      Physical Exam:   Temp 96.9 °F (36.1 °C)   Ht 168.9 cm (66.5\")   Wt 130 kg (286 lb)   LMP 12/25/2018 (Approximate)   BMI 45.47 kg/m²   Patient ambulates in an upright independent fashion.  She looks quite comfortable.    Medical Decision Making    Data Review:   (All imaging studies were personally reviewed unless stated otherwise)  MRI of the lumbar spine dated 11/29/2022 demonstrates some modest diffuse degenerative disc disease.  There is a low-grade listhesis of L3 on L4.  There is diffuse facet arthropathy.  There is soft disc herniation on the left that sits " behind the L2 vertebral body.  My suspicion is that it ascends from L2-3 and not down from L1-2 although that is possible.     Plain flexion-extension films demonstrate a very subtle listhesis of L2 on L3 and L3 on L4 with the listhesis of L4 and L5 that moves about 3 mm or so from flexion to extension.    Diagnosis:   1.  Left L2-3 disc herniation with radiculopathy, improved.  2.  Lumbar spondylolisthesis at multiple levels with mild instability at L4-5.    Treatment Options:   The patient is doing better.  She will decrease her gabapentin to 300 mg at night and do that for couple of weeks.  If she is doing well then she will stop the medicine.  At this juncture she will follow-up on an as-needed basis.       Diagnosis Plan   1. Lumbar disc herniation with radiculopathy        2. Spondylolisthesis of lumbar region        3. Morbid obesity            Scribed for Eloy Villa MD by Bria Forman [facility] 4/7/2023 09:13 EDT      I, Dr. Villa, personally performed the services described in the documentation, as scribed in my presence, and it is both accurate and complete.

## 2023-07-10 ENCOUNTER — TELEPHONE (OUTPATIENT)
Dept: GASTROENTEROLOGY | Facility: CLINIC | Age: 58
End: 2023-07-10

## 2023-07-10 NOTE — TELEPHONE ENCOUNTER
HUB CALLED STATES PATIENT IS RETURNING YOUR ALL TO SCHEDULE A PROCEDURE. TRANSFERRED TO VIANEY SHAW CALL PATIENT. THANKS

## 2023-07-31 ENCOUNTER — TELEPHONE (OUTPATIENT)
Dept: GASTROENTEROLOGY | Facility: CLINIC | Age: 58
End: 2023-07-31

## 2023-07-31 NOTE — TELEPHONE ENCOUNTER
Caller: Anahy Schaffer    Relationship to patient: Self    Best call back number: [phone]    Patient is needing: PATIENT CALLED WANTING TO RESCHEDULE PROCEDURE WITH DR. FREITAS DUE TO WORK CONFLICT ON 9-6-23. PATIENT IS REQUESTING IF POSSIBLE 10-2-23,10-6-23, 11-7-23 AND IF NOT 12-21-23 OR THAT FULL WEEK AFTER BUT ANY TME BEFORE 1-3-24

## 2023-08-01 NOTE — TELEPHONE ENCOUNTER
CALLED PATIENT BACK AND THE CALL WENT STRAIGHT TO [facility]. I HAVE LEFT A VOICEMAIL FOR HER TO CALL US BACK TO RE-SCHEDULE.

## 2023-08-06 PROBLEM — Z23 NEED FOR IMMUNIZATION AGAINST INFLUENZA: Status: RESOLVED | Noted: 2023-01-11 | Resolved: 2023-08-06

## 2023-08-06 PROBLEM — J40 BRONCHITIS: Status: RESOLVED | Noted: 2020-01-07 | Resolved: 2023-08-06

## 2023-08-06 PROBLEM — J32.9 SINUSITIS, BACTERIAL: Status: RESOLVED | Noted: 2023-01-11 | Resolved: 2023-08-06

## 2023-08-06 PROBLEM — J06.9 URI (UPPER RESPIRATORY INFECTION): Status: RESOLVED | Noted: 2023-01-11 | Resolved: 2023-08-06

## 2023-08-06 PROBLEM — J01.00 ACUTE MAXILLARY SINUSITIS: Status: RESOLVED | Noted: 2023-01-11 | Resolved: 2023-08-06

## 2023-08-06 PROBLEM — B96.89 SINUSITIS, BACTERIAL: Status: RESOLVED | Noted: 2023-01-11 | Resolved: 2023-08-06

## 2023-08-06 PROBLEM — R53.81 MALAISE AND FATIGUE: Status: RESOLVED | Noted: 2023-01-11 | Resolved: 2023-08-06

## 2023-08-06 PROBLEM — R05.9 COUGH: Status: RESOLVED | Noted: 2023-01-11 | Resolved: 2023-08-06

## 2023-08-06 PROBLEM — Z23 NEED FOR VACCINATION WITH COMBINED DIPHTHERIA-TETANUS-PERTUSSIS (DTAP): Status: RESOLVED | Noted: 2023-01-11 | Resolved: 2023-08-06

## 2023-08-06 PROBLEM — R53.83 MALAISE AND FATIGUE: Status: RESOLVED | Noted: 2023-01-11 | Resolved: 2023-08-06

## 2023-08-06 PROBLEM — N39.0 UTI (URINARY TRACT INFECTION): Status: RESOLVED | Noted: 2023-01-11 | Resolved: 2023-08-06
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
## 2023-08-07 ENCOUNTER — OFFICE VISIT (OUTPATIENT)
Dept: OBSTETRICS AND GYNECOLOGY | Facility: CLINIC | Age: 58
End: 2023-08-07
Payer: COMMERCIAL

## 2023-08-07 VITALS
BODY MASS INDEX: 47.38 KG/M2 | SYSTOLIC BLOOD PRESSURE: 126 MMHG | RESPIRATION RATE: 14 BRPM | DIASTOLIC BLOOD PRESSURE: 74 MMHG | WEIGHT: 293 LBS

## 2023-08-07 DIAGNOSIS — Z01.419 WELL WOMAN EXAM: Primary | ICD-10-CM

## 2023-08-07 DIAGNOSIS — Z71.85 VACCINE COUNSELING: ICD-10-CM

## 2023-08-07 PROBLEM — F41.1 ANXIETY, GENERALIZED: Status: RESOLVED | Noted: 2023-01-11 | Resolved: 2023-08-07

## 2023-08-07 PROBLEM — R73.09 ABNORMAL GLUCOSE: Status: RESOLVED | Noted: 2023-01-11 | Resolved: 2023-08-07

## 2023-08-07 PROBLEM — R73.01 IFG (IMPAIRED FASTING GLUCOSE): Status: RESOLVED | Noted: 2023-01-11 | Resolved: 2023-08-07

## 2023-08-07 PROBLEM — R73.03 PRE-DIABETES: Status: RESOLVED | Noted: 2018-01-01 | Resolved: 2023-08-07

## 2023-08-07 PROBLEM — M25.561 ACUTE PAIN OF RIGHT KNEE: Status: RESOLVED | Noted: 2023-01-11 | Resolved: 2023-08-07

## 2023-08-07 PROBLEM — M51.26 LUMBAR HERNIATED DISC: Status: RESOLVED | Noted: 2023-01-11 | Resolved: 2023-08-07

## 2023-08-07 PROBLEM — E11.9 TYPE 2 DIABETES MELLITUS: Status: ACTIVE | Noted: 2018-01-01

## 2023-08-07 PROBLEM — J30.9 ALLERGIC RHINITIS: Status: RESOLVED | Noted: 2023-01-11 | Resolved: 2023-08-07

## 2023-08-07 PROCEDURE — 99396 PREV VISIT EST AGE 40-64: CPT | Performed by: OBSTETRICS & GYNECOLOGY

## 2023-08-07 NOTE — PATIENT INSTRUCTIONS
Pneumococcal Conjugate Vaccine (Prevnar 20)    What is this medicine?  PNEUMOCOCCAL VACCINE (DUDLEY mo KEON al vak SEEN) is a vaccine. It prevents pneumococcus bacterial infections. These bacteria can cause serious infections like pneumonia, meningitis, and blood infections. This vaccine will not treat an infection and will not cause infection. This vaccine is recommended for adults 18 years and older.  This medicine may be used for other purposes; ask your health care provider or pharmacist if you have questions.  COMMON BRAND NAME(S): Prevnar 20  What should I tell my health care provider before I take this medicine?  They need to know if you have any of these conditions:  bleeding disorder  fever  immune system problems  an unusual or allergic reaction to pneumococcal vaccine, diphtheria toxoid, other vaccines, other medicines, foods, dyes, or preservatives  pregnant or trying to get pregnant  breast-feeding  How should I use this medicine?  This vaccine is injected into a muscle. It is given by a health care provider.  A copy of Vaccine Information Statements will be given before each vaccination. Be sure to read this information carefully each time. This sheet may change often.  Talk to your health care provider about the use of this medicine in children. Special care may be needed.  Overdosage: If you think you have taken too much of this medicine contact a poison control center or emergency room at once.  NOTE: This medicine is only for you. Do not share this medicine with others.  What may interact with this medicine?  medicines for cancer chemotherapy  medicines that suppress your immune function  steroid medicines like prednisone or cortisone  This list may not describe all possible interactions. Give your health care provider a list of all the medicines, herbs, non-prescription drugs, or dietary supplements you use. Also tell them if you smoke, drink alcohol, or use illegal drugs. Some items may interact with  your medicine.  What should I watch for while using this medicine?  Mild fever and pain should go away in 3 days or less. Report any unusual symptoms to your health care provider.  What side effects may I notice from receiving this medicine?  Side effects that you should report to your doctor or health care professional as soon as possible:  allergic reactions (skin rash, itching or hives; swelling of the face, lips, or tongue)  confusion  fast, irregular heartbeat  fever over 102 degrees F  muscle weakness  seizures  trouble breathing  unusual bruising or bleeding  Side effects that usually do not require medical attention (report to your doctor or health care professional if they continue or are bothersome):  headache  joint pain  muscle cramps, pain  pain, tender at site where injected  This list may not describe all possible side effects. Call your doctor for medical advice about side effects. You may report side effects to FDA at 8-038-FDA-9439.  Where should I keep my medicine?  This vaccine is only given by a health care provider. It will not be stored at home.    NOTE: This sheet is a summary. It may not cover all possible information. If you have questions about this medicine, talk to your doctor, pharmacist, or health care provider.  c 2021 Elsevier/Gold Standard (2021-08-26 16:14:35)

## 2023-08-16 ENCOUNTER — TELEPHONE (OUTPATIENT)
Dept: OBSTETRICS AND GYNECOLOGY | Facility: CLINIC | Age: 58
End: 2023-08-16
Payer: COMMERCIAL

## 2023-08-16 NOTE — TELEPHONE ENCOUNTER
Called and spoke with patient, informed her of update from Dr. Mosqueda/Dr. Dowd, she verifeid understanding and was appreciative of call.  She stated she would call them to cancel this year's appt accordingly.

## 2023-08-16 NOTE — TELEPHONE ENCOUNTER
----- Message from Carlos Dowd MD sent at 8/15/2023  4:10 PM EDT -----  She should be a 10 year follow up if no symptoms or change in family history. Last colon 7/31/18.   ----- Message -----  From: Usama Mosqueda MD  Sent: 8/7/2023   9:03 AM EDT  To: Carlos Dowd MD    Tejinder,  Good morning.  I am seeing Anahy today for her yearly checkup.  She tells me she is scheduled for colonoscopy later this year.  I had a chance to review her records.  Her last colonoscopy only showed hyperplastic polyps.  She has no family history of colorectal polyp.  I do not have the actual colonoscopy report to know if she had a good prep.  Can you please look at your records and see if she really does in fact need colonoscopy or if this can be delayed given only hyperplastic polyp seen and no family history?  Thank

## 2023-08-16 NOTE — TELEPHONE ENCOUNTER
Please call patient and let her know I heard back from Dr. Dowd.  Looks like he is okay with a 10-year follow-up

## 2023-10-26 RX ORDER — SODIUM, POTASSIUM,MAG SULFATES 17.5-3.13G
SOLUTION, RECONSTITUTED, ORAL ORAL
Qty: 354 ML | Refills: 0 | Status: SHIPPED | OUTPATIENT
Start: 2023-10-26

## 2023-10-27 ENCOUNTER — TELEPHONE (OUTPATIENT)
Dept: GASTROENTEROLOGY | Facility: CLINIC | Age: 58
End: 2023-10-27

## 2023-10-27 NOTE — TELEPHONE ENCOUNTER
Provider: DARLING FREITAS    Caller: FABI GARCIA    Relationship to Patient: SELF      Phone Number: [phone]    Reason for Call: PATIENT CALLED IN TO CONFIRM THAT HER UPCOMING PROCEDURE SCHEDULED FOR 11/07/2023 WAS CANCELLED. PATIENT STATED THAT SHE REQUESTED FOR THE PROCEDURE TO BE CANCELLED MONTHS AGO. PLEASE CANCEL.

## 2024-03-05 ENCOUNTER — TRANSCRIBE ORDERS (OUTPATIENT)
Dept: ADMINISTRATIVE | Facility: HOSPITAL | Age: 59
End: 2024-03-05
Payer: COMMERCIAL

## 2024-03-05 DIAGNOSIS — Z12.31 VISIT FOR SCREENING MAMMOGRAM: Primary | ICD-10-CM

## 2024-04-06 ENCOUNTER — HOSPITAL ENCOUNTER (OUTPATIENT)
Dept: MAMMOGRAPHY | Facility: HOSPITAL | Age: 59
Discharge: HOME | End: 2024-04-06
Admitting: OBSTETRICS & GYNECOLOGY
Payer: COMMERCIAL

## 2024-04-06 DIAGNOSIS — Z12.31 VISIT FOR SCREENING MAMMOGRAM: ICD-10-CM

## 2024-04-06 PROCEDURE — 77067 SCR MAMMO BI INCL CAD: CPT

## 2024-04-06 PROCEDURE — 77063 BREAST TOMOSYNTHESIS BI: CPT

## 2024-12-19 ENCOUNTER — APPOINTMENT (OUTPATIENT)
Facility: HOSPITAL | Age: 59
End: 2024-12-19
Payer: COMMERCIAL

## 2024-12-19 ENCOUNTER — HOSPITAL ENCOUNTER (OUTPATIENT)
Facility: HOSPITAL | Age: 59
Setting detail: OBSERVATION
Discharge: HOME | End: 2024-12-20
Attending: STUDENT IN AN ORGANIZED HEALTH CARE EDUCATION/TRAINING PROGRAM | Admitting: STUDENT IN AN ORGANIZED HEALTH CARE EDUCATION/TRAINING PROGRAM
Payer: COMMERCIAL

## 2024-12-19 DIAGNOSIS — I21.4 NON-STEMI (NON-ST ELEVATED MYOCARDIAL INFARCTION): Primary | ICD-10-CM

## 2024-12-19 LAB
ALBUMIN SERPL-MCNC: 4.5 G/DL (ref 3.5–5.2)
ALBUMIN/GLOB SERPL: 1.5 G/DL
ALP SERPL-CCNC: 112 U/L (ref 39–117)
ALT SERPL W P-5'-P-CCNC: 56 U/L (ref 1–33)
ANION GAP SERPL CALCULATED.3IONS-SCNC: 13.5 MMOL/L (ref 5–15)
AST SERPL-CCNC: 62 U/L (ref 1–32)
BACTERIA UR QL AUTO: ABNORMAL /HPF
BASOPHILS # BLD AUTO: 0.01 10*3/MM3 (ref 0–0.2)
BASOPHILS NFR BLD AUTO: 0.1 % (ref 0–1.5)
BILIRUB SERPL-MCNC: 0.8 MG/DL (ref 0–1.2)
BILIRUB UR QL STRIP: NEGATIVE
BUN SERPL-MCNC: 13 MG/DL (ref 6–20)
BUN/CREAT SERPL: 15.1 (ref 7–25)
CALCIUM SPEC-SCNC: 9.8 MG/DL (ref 8.6–10.5)
CHLORIDE SERPL-SCNC: 99 MMOL/L (ref 98–107)
CLARITY UR: ABNORMAL
CO2 SERPL-SCNC: 23.5 MMOL/L (ref 22–29)
COLOR UR: YELLOW
CREAT SERPL-MCNC: 0.86 MG/DL (ref 0.57–1)
DEPRECATED RDW RBC AUTO: 45.1 FL (ref 37–54)
EGFRCR SERPLBLD CKD-EPI 2021: 77.9 ML/MIN/1.73
EOSINOPHIL # BLD AUTO: 0.06 10*3/MM3 (ref 0–0.4)
EOSINOPHIL NFR BLD AUTO: 0.5 % (ref 0.3–6.2)
ERYTHROCYTE [DISTWIDTH] IN BLOOD BY AUTOMATED COUNT: 13.4 % (ref 12.3–15.4)
GEN 5 1HR TROPONIN T REFLEX: 24 NG/L
GLOBULIN UR ELPH-MCNC: 3 GM/DL
GLUCOSE SERPL-MCNC: 227 MG/DL (ref 65–99)
GLUCOSE UR STRIP-MCNC: NEGATIVE MG/DL
HCT VFR BLD AUTO: 41.8 % (ref 34–46.6)
HGB BLD-MCNC: 13.9 G/DL (ref 12–15.9)
HGB UR QL STRIP.AUTO: NEGATIVE
HOLD SPECIMEN: NORMAL
HYALINE CASTS UR QL AUTO: ABNORMAL /LPF
IMM GRANULOCYTES # BLD AUTO: 0.29 10*3/MM3 (ref 0–0.05)
IMM GRANULOCYTES NFR BLD AUTO: 2.5 % (ref 0–0.5)
KETONES UR QL STRIP: ABNORMAL
LEUKOCYTE ESTERASE UR QL STRIP.AUTO: ABNORMAL
LYMPHOCYTES # BLD AUTO: 1.78 10*3/MM3 (ref 0.7–3.1)
LYMPHOCYTES NFR BLD AUTO: 15.2 % (ref 19.6–45.3)
MAGNESIUM SERPL-MCNC: 1.9 MG/DL (ref 1.6–2.6)
MCH RBC QN AUTO: 30.2 PG (ref 26.6–33)
MCHC RBC AUTO-ENTMCNC: 33.3 G/DL (ref 31.5–35.7)
MCV RBC AUTO: 90.9 FL (ref 79–97)
MONOCYTES # BLD AUTO: 0.21 10*3/MM3 (ref 0.1–0.9)
MONOCYTES NFR BLD AUTO: 1.8 % (ref 5–12)
NEUTROPHILS NFR BLD AUTO: 79.9 % (ref 42.7–76)
NEUTROPHILS NFR BLD AUTO: 9.36 10*3/MM3 (ref 1.7–7)
NITRITE UR QL STRIP: NEGATIVE
PH UR STRIP.AUTO: 6 [PH] (ref 5–8)
PLATELET # BLD AUTO: 221 10*3/MM3 (ref 140–450)
PMV BLD AUTO: 11.3 FL (ref 6–12)
POTASSIUM SERPL-SCNC: 2.7 MMOL/L (ref 3.5–5.2)
PROT SERPL-MCNC: 7.5 G/DL (ref 6–8.5)
PROT UR QL STRIP: ABNORMAL
RBC # BLD AUTO: 4.6 10*6/MM3 (ref 3.77–5.28)
RBC # UR STRIP: ABNORMAL /HPF
REF LAB TEST METHOD: ABNORMAL
SODIUM SERPL-SCNC: 136 MMOL/L (ref 136–145)
SP GR UR STRIP: >=1.03 (ref 1–1.03)
SQUAMOUS #/AREA URNS HPF: ABNORMAL /HPF
TROPONIN T % DELTA: 71 %
TROPONIN T NUMERIC DELTA: 10 NG/L
TROPONIN T SERPL HS-MCNC: 14 NG/L
UROBILINOGEN UR QL STRIP: ABNORMAL
WBC # UR STRIP: ABNORMAL /HPF
WBC NRBC COR # BLD AUTO: 11.71 10*3/MM3 (ref 3.4–10.8)
WHOLE BLOOD HOLD COAG: NORMAL
WHOLE BLOOD HOLD SPECIMEN: NORMAL

## 2024-12-19 PROCEDURE — 96365 THER/PROPH/DIAG IV INF INIT: CPT

## 2024-12-19 PROCEDURE — 81001 URINALYSIS AUTO W/SCOPE: CPT | Performed by: PHYSICIAN ASSISTANT

## 2024-12-19 PROCEDURE — 36415 COLL VENOUS BLD VENIPUNCTURE: CPT

## 2024-12-19 PROCEDURE — 25010000002 POTASSIUM CHLORIDE 10 MEQ/100ML SOLUTION: Performed by: PHYSICIAN ASSISTANT

## 2024-12-19 PROCEDURE — 25510000001 IOPAMIDOL PER 1 ML: Performed by: STUDENT IN AN ORGANIZED HEALTH CARE EDUCATION/TRAINING PROGRAM

## 2024-12-19 PROCEDURE — 70450 CT HEAD/BRAIN W/O DYE: CPT

## 2024-12-19 PROCEDURE — 25010000002 ONDANSETRON PER 1 MG: Performed by: PHYSICIAN ASSISTANT

## 2024-12-19 PROCEDURE — 71046 X-RAY EXAM CHEST 2 VIEWS: CPT

## 2024-12-19 PROCEDURE — 85025 COMPLETE CBC W/AUTO DIFF WBC: CPT | Performed by: PHYSICIAN ASSISTANT

## 2024-12-19 PROCEDURE — 83735 ASSAY OF MAGNESIUM: CPT | Performed by: PHYSICIAN ASSISTANT

## 2024-12-19 PROCEDURE — 93005 ELECTROCARDIOGRAM TRACING: CPT | Performed by: PHYSICIAN ASSISTANT

## 2024-12-19 PROCEDURE — 96375 TX/PRO/DX INJ NEW DRUG ADDON: CPT

## 2024-12-19 PROCEDURE — 71275 CT ANGIOGRAPHY CHEST: CPT

## 2024-12-19 PROCEDURE — 99285 EMERGENCY DEPT VISIT HI MDM: CPT

## 2024-12-19 PROCEDURE — 80053 COMPREHEN METABOLIC PANEL: CPT | Performed by: PHYSICIAN ASSISTANT

## 2024-12-19 PROCEDURE — 84484 ASSAY OF TROPONIN QUANT: CPT | Performed by: PHYSICIAN ASSISTANT

## 2024-12-19 RX ORDER — ASPIRIN 81 MG/1
324 TABLET, CHEWABLE ORAL ONCE
Status: COMPLETED | OUTPATIENT
Start: 2024-12-19 | End: 2024-12-19

## 2024-12-19 RX ORDER — ONDANSETRON 2 MG/ML
4 INJECTION INTRAMUSCULAR; INTRAVENOUS ONCE
Status: COMPLETED | OUTPATIENT
Start: 2024-12-19 | End: 2024-12-19

## 2024-12-19 RX ORDER — IOPAMIDOL 755 MG/ML
100 INJECTION, SOLUTION INTRAVASCULAR
Status: COMPLETED | OUTPATIENT
Start: 2024-12-19 | End: 2024-12-19

## 2024-12-19 RX ORDER — POTASSIUM CHLORIDE 750 MG/1
40 CAPSULE, EXTENDED RELEASE ORAL ONCE
Status: COMPLETED | OUTPATIENT
Start: 2024-12-19 | End: 2024-12-19

## 2024-12-19 RX ORDER — POTASSIUM CHLORIDE 7.45 MG/ML
10 INJECTION INTRAVENOUS ONCE
Status: COMPLETED | OUTPATIENT
Start: 2024-12-19 | End: 2024-12-19

## 2024-12-19 RX ORDER — SODIUM CHLORIDE 0.9 % (FLUSH) 0.9 %
10 SYRINGE (ML) INJECTION AS NEEDED
Status: DISCONTINUED | OUTPATIENT
Start: 2024-12-19 | End: 2024-12-20 | Stop reason: HOSPADM

## 2024-12-19 RX ADMIN — IOPAMIDOL 85 ML: 755 INJECTION, SOLUTION INTRAVENOUS at 21:25

## 2024-12-19 RX ADMIN — POTASSIUM CHLORIDE 40 MEQ: 750 CAPSULE, EXTENDED RELEASE ORAL at 21:33

## 2024-12-19 RX ADMIN — POTASSIUM CHLORIDE 10 MEQ: 7.46 INJECTION, SOLUTION INTRAVENOUS at 21:37

## 2024-12-19 RX ADMIN — ONDANSETRON 4 MG: 2 INJECTION INTRAMUSCULAR; INTRAVENOUS at 21:06

## 2024-12-19 RX ADMIN — ASPIRIN 81 MG 324 MG: 81 TABLET ORAL at 22:13

## 2024-12-20 ENCOUNTER — APPOINTMENT (OUTPATIENT)
Dept: CARDIOLOGY | Facility: HOSPITAL | Age: 59
End: 2024-12-20
Payer: COMMERCIAL

## 2024-12-20 VITALS
WEIGHT: 293 LBS | DIASTOLIC BLOOD PRESSURE: 71 MMHG | SYSTOLIC BLOOD PRESSURE: 122 MMHG | BODY MASS INDEX: 45.99 KG/M2 | OXYGEN SATURATION: 90 % | HEART RATE: 88 BPM | RESPIRATION RATE: 18 BRPM | TEMPERATURE: 98.4 F | HEIGHT: 67 IN

## 2024-12-20 PROBLEM — I21.4 NSTEMI (NON-ST ELEVATED MYOCARDIAL INFARCTION): Status: ACTIVE | Noted: 2024-12-20

## 2024-12-20 PROBLEM — I21.4 NSTEMI (NON-ST ELEVATED MYOCARDIAL INFARCTION): Status: RESOLVED | Noted: 2024-12-20 | Resolved: 2024-12-20

## 2024-12-20 LAB
ALBUMIN SERPL-MCNC: 4.1 G/DL (ref 3.5–5.2)
ALBUMIN/GLOB SERPL: 1.5 G/DL
ALP SERPL-CCNC: 99 U/L (ref 39–117)
ALT SERPL W P-5'-P-CCNC: 51 U/L (ref 1–33)
ANION GAP SERPL CALCULATED.3IONS-SCNC: 13 MMOL/L (ref 5–15)
AST SERPL-CCNC: 47 U/L (ref 1–32)
AV MEAN PRESS GRAD SYS DOP V1V2: 4 MMHG
AV VMAX SYS DOP: 144 CM/SEC
BH CV ECHO MEAS - AO MAX PG: 8.3 MMHG
BH CV ECHO MEAS - AO ROOT DIAM: 3 CM
BH CV ECHO MEAS - AO V2 VTI: 30.3 CM
BH CV ECHO MEAS - AVA(I,D): 2.37 CM2
BH CV ECHO MEAS - EDV(CUBED): 117.6 ML
BH CV ECHO MEAS - EDV(MOD-SP2): 80.8 ML
BH CV ECHO MEAS - EDV(MOD-SP4): 106 ML
BH CV ECHO MEAS - EF(MOD-SP2): 55 %
BH CV ECHO MEAS - EF(MOD-SP4): 59.7 %
BH CV ECHO MEAS - ESV(CUBED): 29.8 ML
BH CV ECHO MEAS - ESV(MOD-SP2): 36.4 ML
BH CV ECHO MEAS - ESV(MOD-SP4): 42.7 ML
BH CV ECHO MEAS - FS: 36.7 %
BH CV ECHO MEAS - IVS/LVPW: 1 CM
BH CV ECHO MEAS - IVSD: 1.1 CM
BH CV ECHO MEAS - LA DIMENSION: 3.8 CM
BH CV ECHO MEAS - LAT PEAK E' VEL: 8.5 CM/SEC
BH CV ECHO MEAS - LV MASS(C)D: 200.5 GRAMS
BH CV ECHO MEAS - LV MAX PG: 4.7 MMHG
BH CV ECHO MEAS - LV MEAN PG: 2.5 MMHG
BH CV ECHO MEAS - LV V1 MAX: 108.3 CM/SEC
BH CV ECHO MEAS - LV V1 VTI: 22.9 CM
BH CV ECHO MEAS - LVIDD: 4.9 CM
BH CV ECHO MEAS - LVIDS: 3.1 CM
BH CV ECHO MEAS - LVOT AREA: 3.1 CM2
BH CV ECHO MEAS - LVOT DIAM: 2 CM
BH CV ECHO MEAS - LVPWD: 1.1 CM
BH CV ECHO MEAS - MED PEAK E' VEL: 9.9 CM/SEC
BH CV ECHO MEAS - MV A MAX VEL: 98.5 CM/SEC
BH CV ECHO MEAS - MV DEC SLOPE: 374 CM/SEC2
BH CV ECHO MEAS - MV DEC TIME: 0.23 SEC
BH CV ECHO MEAS - MV E MAX VEL: 87 CM/SEC
BH CV ECHO MEAS - MV E/A: 0.88
BH CV ECHO MEAS - MV MAX PG: 3.1 MMHG
BH CV ECHO MEAS - MV MEAN PG: 2 MMHG
BH CV ECHO MEAS - MV P1/2T: 78.1 MSEC
BH CV ECHO MEAS - MV V2 VTI: 32 CM
BH CV ECHO MEAS - MVA(P1/2T): 2.8 CM2
BH CV ECHO MEAS - MVA(VTI): 2.25 CM2
BH CV ECHO MEAS - PA ACC TIME: 0.11 SEC
BH CV ECHO MEAS - SV(LVOT): 71.9 ML
BH CV ECHO MEAS - SV(MOD-SP2): 44.4 ML
BH CV ECHO MEAS - SV(MOD-SP4): 63.3 ML
BH CV ECHO MEAS - TAPSE (>1.6): 3 CM
BH CV ECHO MEASUREMENTS AVERAGE E/E' RATIO: 9.46
BH CV VAS BP RIGHT ARM: NORMAL MMHG
BH CV XLRA - TDI S': 18.3 CM/SEC
BILIRUB SERPL-MCNC: 0.6 MG/DL (ref 0–1.2)
BUN SERPL-MCNC: 12 MG/DL (ref 6–20)
BUN/CREAT SERPL: 18.8 (ref 7–25)
CALCIUM SPEC-SCNC: 9.5 MG/DL (ref 8.6–10.5)
CHLORIDE SERPL-SCNC: 103 MMOL/L (ref 98–107)
CHOLEST SERPL-MCNC: 154 MG/DL (ref 0–200)
CO2 SERPL-SCNC: 25 MMOL/L (ref 22–29)
CREAT SERPL-MCNC: 0.64 MG/DL (ref 0.57–1)
DEPRECATED RDW RBC AUTO: 46.2 FL (ref 37–54)
EGFRCR SERPLBLD CKD-EPI 2021: 101.9 ML/MIN/1.73
ERYTHROCYTE [DISTWIDTH] IN BLOOD BY AUTOMATED COUNT: 13.6 % (ref 12.3–15.4)
GLOBULIN UR ELPH-MCNC: 2.8 GM/DL
GLUCOSE BLDC GLUCOMTR-MCNC: 116 MG/DL (ref 70–130)
GLUCOSE BLDC GLUCOMTR-MCNC: 127 MG/DL (ref 70–130)
GLUCOSE BLDC GLUCOMTR-MCNC: 131 MG/DL (ref 70–130)
GLUCOSE BLDC GLUCOMTR-MCNC: 133 MG/DL (ref 70–130)
GLUCOSE SERPL-MCNC: 131 MG/DL (ref 65–99)
HBA1C MFR BLD: 6.2 % (ref 4.8–5.6)
HCT VFR BLD AUTO: 40.2 % (ref 34–46.6)
HDLC SERPL-MCNC: 60 MG/DL (ref 40–60)
HGB BLD-MCNC: 13.3 G/DL (ref 12–15.9)
LDLC SERPL CALC-MCNC: 78 MG/DL (ref 0–100)
LDLC/HDLC SERPL: 1.27 {RATIO}
LEFT ATRIUM VOLUME INDEX: 15.7 ML/M2
LV EF BIPLANE MOD: 57.8 %
MAGNESIUM SERPL-MCNC: 2.1 MG/DL (ref 1.6–2.6)
MCH RBC QN AUTO: 30.6 PG (ref 26.6–33)
MCHC RBC AUTO-ENTMCNC: 33.1 G/DL (ref 31.5–35.7)
MCV RBC AUTO: 92.4 FL (ref 79–97)
PLATELET # BLD AUTO: 215 10*3/MM3 (ref 140–450)
PMV BLD AUTO: 11.4 FL (ref 6–12)
POTASSIUM SERPL-SCNC: 3.7 MMOL/L (ref 3.5–5.2)
PROT SERPL-MCNC: 6.9 G/DL (ref 6–8.5)
QT INTERVAL: 414 MS
QT INTERVAL: 418 MS
QT INTERVAL: 446 MS
QTC INTERVAL: 457 MS
QTC INTERVAL: 459 MS
QTC INTERVAL: 477 MS
RBC # BLD AUTO: 4.35 10*6/MM3 (ref 3.77–5.28)
SODIUM SERPL-SCNC: 141 MMOL/L (ref 136–145)
TRIGL SERPL-MCNC: 88 MG/DL (ref 0–150)
TROPONIN T SERPL HS-MCNC: 14 NG/L
TSH SERPL DL<=0.05 MIU/L-ACNC: 3.37 UIU/ML (ref 0.27–4.2)
VLDLC SERPL-MCNC: 16 MG/DL (ref 5–40)
WBC NRBC COR # BLD AUTO: 14.49 10*3/MM3 (ref 3.4–10.8)

## 2024-12-20 PROCEDURE — 96376 TX/PRO/DX INJ SAME DRUG ADON: CPT

## 2024-12-20 PROCEDURE — 25010000002 MIDAZOLAM PER 1 MG: Performed by: INTERNAL MEDICINE

## 2024-12-20 PROCEDURE — 80061 LIPID PANEL: CPT | Performed by: FAMILY MEDICINE

## 2024-12-20 PROCEDURE — 82948 REAGENT STRIP/BLOOD GLUCOSE: CPT

## 2024-12-20 PROCEDURE — 25010000002 LIDOCAINE 1 % SOLUTION: Performed by: INTERNAL MEDICINE

## 2024-12-20 PROCEDURE — 25010000002 HEPARIN (PORCINE) PER 1000 UNITS: Performed by: INTERNAL MEDICINE

## 2024-12-20 PROCEDURE — G0378 HOSPITAL OBSERVATION PER HR: HCPCS

## 2024-12-20 PROCEDURE — 25010000002 FENTANYL CITRATE (PF) 50 MCG/ML SOLUTION: Performed by: INTERNAL MEDICINE

## 2024-12-20 PROCEDURE — 83036 HEMOGLOBIN GLYCOSYLATED A1C: CPT | Performed by: FAMILY MEDICINE

## 2024-12-20 PROCEDURE — 93005 ELECTROCARDIOGRAM TRACING: CPT | Performed by: FAMILY MEDICINE

## 2024-12-20 PROCEDURE — 25010000002 ENOXAPARIN PER 10 MG: Performed by: FAMILY MEDICINE

## 2024-12-20 PROCEDURE — 84484 ASSAY OF TROPONIN QUANT: CPT | Performed by: FAMILY MEDICINE

## 2024-12-20 PROCEDURE — 25510000001 IOPAMIDOL PER 1 ML: Performed by: INTERNAL MEDICINE

## 2024-12-20 PROCEDURE — 80050 GENERAL HEALTH PANEL: CPT | Performed by: FAMILY MEDICINE

## 2024-12-20 PROCEDURE — C1894 INTRO/SHEATH, NON-LASER: HCPCS | Performed by: INTERNAL MEDICINE

## 2024-12-20 PROCEDURE — 25010000002 ONDANSETRON PER 1 MG: Performed by: PHYSICIAN ASSISTANT

## 2024-12-20 PROCEDURE — 93454 CORONARY ARTERY ANGIO S&I: CPT | Performed by: INTERNAL MEDICINE

## 2024-12-20 PROCEDURE — 93306 TTE W/DOPPLER COMPLETE: CPT

## 2024-12-20 PROCEDURE — 96372 THER/PROPH/DIAG INJ SC/IM: CPT

## 2024-12-20 PROCEDURE — 83735 ASSAY OF MAGNESIUM: CPT | Performed by: FAMILY MEDICINE

## 2024-12-20 PROCEDURE — 25010000002 ENOXAPARIN PER 10 MG: Performed by: INTERNAL MEDICINE

## 2024-12-20 PROCEDURE — C1769 GUIDE WIRE: HCPCS | Performed by: INTERNAL MEDICINE

## 2024-12-20 PROCEDURE — 25010000002 NICARDIPINE 2.5 MG/ML SOLUTION: Performed by: INTERNAL MEDICINE

## 2024-12-20 RX ORDER — IBUPROFEN 600 MG/1
1 TABLET ORAL
Status: DISCONTINUED | OUTPATIENT
Start: 2024-12-20 | End: 2024-12-20 | Stop reason: HOSPADM

## 2024-12-20 RX ORDER — FENTANYL CITRATE 50 UG/ML
INJECTION, SOLUTION INTRAMUSCULAR; INTRAVENOUS
Status: DISCONTINUED | OUTPATIENT
Start: 2024-12-20 | End: 2024-12-20 | Stop reason: HOSPADM

## 2024-12-20 RX ORDER — HYDROCHLOROTHIAZIDE 25 MG/1
25 TABLET ORAL DAILY
Status: DISCONTINUED | OUTPATIENT
Start: 2024-12-20 | End: 2024-12-20 | Stop reason: HOSPADM

## 2024-12-20 RX ORDER — ONDANSETRON 4 MG/1
4 TABLET, ORALLY DISINTEGRATING ORAL EVERY 6 HOURS PRN
Status: DISCONTINUED | OUTPATIENT
Start: 2024-12-20 | End: 2024-12-20 | Stop reason: HOSPADM

## 2024-12-20 RX ORDER — ATORVASTATIN CALCIUM 10 MG/1
10 TABLET, FILM COATED ORAL NIGHTLY
Status: DISCONTINUED | OUTPATIENT
Start: 2024-12-20 | End: 2024-12-20 | Stop reason: HOSPADM

## 2024-12-20 RX ORDER — AMOXICILLIN 250 MG
2 CAPSULE ORAL 2 TIMES DAILY PRN
Status: DISCONTINUED | OUTPATIENT
Start: 2024-12-20 | End: 2024-12-20 | Stop reason: HOSPADM

## 2024-12-20 RX ORDER — ACETAMINOPHEN 160 MG/5ML
650 SOLUTION ORAL EVERY 4 HOURS PRN
Status: DISCONTINUED | OUTPATIENT
Start: 2024-12-20 | End: 2024-12-20 | Stop reason: HOSPADM

## 2024-12-20 RX ORDER — BISACODYL 5 MG/1
5 TABLET, DELAYED RELEASE ORAL DAILY PRN
Status: DISCONTINUED | OUTPATIENT
Start: 2024-12-20 | End: 2024-12-20 | Stop reason: HOSPADM

## 2024-12-20 RX ORDER — ACETAMINOPHEN 325 MG/1
650 TABLET ORAL EVERY 4 HOURS PRN
Status: DISCONTINUED | OUTPATIENT
Start: 2024-12-20 | End: 2024-12-20 | Stop reason: HOSPADM

## 2024-12-20 RX ORDER — POTASSIUM CHLORIDE 1500 MG/1
20 TABLET, EXTENDED RELEASE ORAL ONCE
Status: COMPLETED | OUTPATIENT
Start: 2024-12-20 | End: 2024-12-20

## 2024-12-20 RX ORDER — PANTOPRAZOLE SODIUM 40 MG/1
40 TABLET, DELAYED RELEASE ORAL
Status: DISCONTINUED | OUTPATIENT
Start: 2024-12-20 | End: 2024-12-20 | Stop reason: HOSPADM

## 2024-12-20 RX ORDER — POLYETHYLENE GLYCOL 3350 17 G/17G
17 POWDER, FOR SOLUTION ORAL DAILY PRN
Status: DISCONTINUED | OUTPATIENT
Start: 2024-12-20 | End: 2024-12-20 | Stop reason: HOSPADM

## 2024-12-20 RX ORDER — SODIUM CHLORIDE 0.9 % (FLUSH) 0.9 %
10 SYRINGE (ML) INJECTION EVERY 12 HOURS SCHEDULED
Status: DISCONTINUED | OUTPATIENT
Start: 2024-12-20 | End: 2024-12-20

## 2024-12-20 RX ORDER — ONDANSETRON 2 MG/ML
4 INJECTION INTRAMUSCULAR; INTRAVENOUS EVERY 6 HOURS PRN
Status: DISCONTINUED | OUTPATIENT
Start: 2024-12-20 | End: 2024-12-20 | Stop reason: HOSPADM

## 2024-12-20 RX ORDER — ACETAMINOPHEN 325 MG/1
650 TABLET ORAL EVERY 4 HOURS PRN
Status: DISCONTINUED | OUTPATIENT
Start: 2024-12-20 | End: 2024-12-20

## 2024-12-20 RX ORDER — LIDOCAINE HYDROCHLORIDE 10 MG/ML
INJECTION, SOLUTION INFILTRATION; PERINEURAL
Status: DISCONTINUED | OUTPATIENT
Start: 2024-12-20 | End: 2024-12-20 | Stop reason: HOSPADM

## 2024-12-20 RX ORDER — BISACODYL 10 MG
10 SUPPOSITORY, RECTAL RECTAL DAILY PRN
Status: DISCONTINUED | OUTPATIENT
Start: 2024-12-20 | End: 2024-12-20 | Stop reason: HOSPADM

## 2024-12-20 RX ORDER — NITROGLYCERIN 0.4 MG/1
0.4 TABLET SUBLINGUAL
Status: DISCONTINUED | OUTPATIENT
Start: 2024-12-20 | End: 2024-12-20 | Stop reason: HOSPADM

## 2024-12-20 RX ORDER — IOPAMIDOL 755 MG/ML
INJECTION, SOLUTION INTRAVASCULAR
Status: DISCONTINUED | OUTPATIENT
Start: 2024-12-20 | End: 2024-12-20 | Stop reason: HOSPADM

## 2024-12-20 RX ORDER — HEPARIN SODIUM 1000 [USP'U]/ML
INJECTION, SOLUTION INTRAVENOUS; SUBCUTANEOUS
Status: DISCONTINUED | OUTPATIENT
Start: 2024-12-20 | End: 2024-12-20 | Stop reason: HOSPADM

## 2024-12-20 RX ORDER — MONTELUKAST SODIUM 10 MG/1
10 TABLET ORAL NIGHTLY
Status: DISCONTINUED | OUTPATIENT
Start: 2024-12-20 | End: 2024-12-20 | Stop reason: HOSPADM

## 2024-12-20 RX ORDER — MIDAZOLAM HYDROCHLORIDE 1 MG/ML
INJECTION, SOLUTION INTRAMUSCULAR; INTRAVENOUS
Status: DISCONTINUED | OUTPATIENT
Start: 2024-12-20 | End: 2024-12-20 | Stop reason: HOSPADM

## 2024-12-20 RX ORDER — DEXTROSE MONOHYDRATE 25 G/50ML
25 INJECTION, SOLUTION INTRAVENOUS
Status: DISCONTINUED | OUTPATIENT
Start: 2024-12-20 | End: 2024-12-20 | Stop reason: HOSPADM

## 2024-12-20 RX ORDER — NICOTINE POLACRILEX 4 MG
15 LOZENGE BUCCAL
Status: DISCONTINUED | OUTPATIENT
Start: 2024-12-20 | End: 2024-12-20 | Stop reason: HOSPADM

## 2024-12-20 RX ORDER — SODIUM CHLORIDE 0.9 % (FLUSH) 0.9 %
10 SYRINGE (ML) INJECTION AS NEEDED
Status: DISCONTINUED | OUTPATIENT
Start: 2024-12-20 | End: 2024-12-20

## 2024-12-20 RX ORDER — CETIRIZINE HYDROCHLORIDE 10 MG/1
10 TABLET ORAL DAILY
Status: DISCONTINUED | OUTPATIENT
Start: 2024-12-20 | End: 2024-12-20 | Stop reason: HOSPADM

## 2024-12-20 RX ORDER — SODIUM CHLORIDE 9 MG/ML
40 INJECTION, SOLUTION INTRAVENOUS AS NEEDED
Status: DISCONTINUED | OUTPATIENT
Start: 2024-12-20 | End: 2024-12-20 | Stop reason: HOSPADM

## 2024-12-20 RX ORDER — ONDANSETRON 2 MG/ML
4 INJECTION INTRAMUSCULAR; INTRAVENOUS ONCE
Status: COMPLETED | OUTPATIENT
Start: 2024-12-20 | End: 2024-12-20

## 2024-12-20 RX ORDER — ACETAMINOPHEN 650 MG/1
650 SUPPOSITORY RECTAL EVERY 4 HOURS PRN
Status: DISCONTINUED | OUTPATIENT
Start: 2024-12-20 | End: 2024-12-20 | Stop reason: HOSPADM

## 2024-12-20 RX ORDER — ASPIRIN 81 MG/1
81 TABLET ORAL DAILY
Qty: 30 TABLET | Refills: 0 | Status: SHIPPED | OUTPATIENT
Start: 2024-12-20

## 2024-12-20 RX ORDER — NITROGLYCERIN 0.4 MG/1
0.4 TABLET SUBLINGUAL
Status: DISCONTINUED | OUTPATIENT
Start: 2024-12-20 | End: 2024-12-20

## 2024-12-20 RX ORDER — POTASSIUM CHLORIDE 750 MG/1
20 CAPSULE, EXTENDED RELEASE ORAL DAILY
Qty: 40 CAPSULE | Refills: 0 | Status: SHIPPED | OUTPATIENT
Start: 2024-12-20

## 2024-12-20 RX ORDER — ENOXAPARIN SODIUM 100 MG/ML
40 INJECTION SUBCUTANEOUS EVERY 12 HOURS
Status: DISCONTINUED | OUTPATIENT
Start: 2024-12-20 | End: 2024-12-20 | Stop reason: HOSPADM

## 2024-12-20 RX ORDER — FLUTICASONE PROPIONATE 50 MCG
1 SPRAY, SUSPENSION (ML) NASAL 2 TIMES DAILY
Status: DISCONTINUED | OUTPATIENT
Start: 2024-12-20 | End: 2024-12-20 | Stop reason: HOSPADM

## 2024-12-20 RX ORDER — AMOXICILLIN 250 MG/1
500 CAPSULE ORAL EVERY 8 HOURS SCHEDULED
Status: DISCONTINUED | OUTPATIENT
Start: 2024-12-20 | End: 2024-12-20 | Stop reason: HOSPADM

## 2024-12-20 RX ORDER — CHOLECALCIFEROL (VITAMIN D3) 25 MCG
1000 TABLET ORAL DAILY
Status: DISCONTINUED | OUTPATIENT
Start: 2024-12-20 | End: 2024-12-20 | Stop reason: HOSPADM

## 2024-12-20 RX ORDER — MULTIPLE VITAMINS W/ MINERALS TAB 9MG-400MCG
1 TAB ORAL DAILY
Status: DISCONTINUED | OUTPATIENT
Start: 2024-12-20 | End: 2024-12-20 | Stop reason: HOSPADM

## 2024-12-20 RX ADMIN — ACETAMINOPHEN 650 MG: 325 TABLET ORAL at 09:49

## 2024-12-20 RX ADMIN — ENOXAPARIN SODIUM 40 MG: 100 INJECTION SUBCUTANEOUS at 09:45

## 2024-12-20 RX ADMIN — CETIRIZINE HYDROCHLORIDE 10 MG: 10 TABLET, FILM COATED ORAL at 09:45

## 2024-12-20 RX ADMIN — Medication 10 ML: at 01:49

## 2024-12-20 RX ADMIN — MULTIPLE VITAMINS W/ MINERALS TAB 1 TABLET: TAB at 09:46

## 2024-12-20 RX ADMIN — PANTOPRAZOLE SODIUM 40 MG: 40 TABLET, DELAYED RELEASE ORAL at 05:46

## 2024-12-20 RX ADMIN — POTASSIUM CHLORIDE 20 MEQ: 1500 TABLET, EXTENDED RELEASE ORAL at 12:01

## 2024-12-20 RX ADMIN — Medication 1000 UNITS: at 09:44

## 2024-12-20 RX ADMIN — AMOXICILLIN 500 MG: 250 CAPSULE ORAL at 05:46

## 2024-12-20 RX ADMIN — MONTELUKAST 10 MG: 10 TABLET, FILM COATED ORAL at 01:49

## 2024-12-20 RX ADMIN — ONDANSETRON 4 MG: 2 INJECTION INTRAMUSCULAR; INTRAVENOUS at 00:20

## 2024-12-20 RX ADMIN — HYDROCHLOROTHIAZIDE 25 MG: 25 TABLET ORAL at 09:45

## 2024-12-20 RX ADMIN — ATORVASTATIN CALCIUM 10 MG: 10 TABLET, FILM COATED ORAL at 01:49

## 2024-12-20 RX ADMIN — Medication 10 ML: at 09:45

## 2024-12-20 RX ADMIN — SERTRALINE HYDROCHLORIDE 50 MG: 50 TABLET ORAL at 09:45

## 2024-12-20 NOTE — PROGRESS NOTES
[facility] Medicine Services  ADMISSION FOLLOW-UP NOTE          Patient admitted after midnight, H&P by my partner performed earlier on today's date reviewed.  Interim findings, labs, and charting also reviewed.        The [facility] Hospital Problem List has been managed and updated to include any new diagnoses:  Active Hospital Problems    Diagnosis  POA    **NSTEMI (non-ST elevated myocardial infarction) [I21.4]  Yes    Elevated liver enzymes [R74.8]  Yes    Fatty liver [K76.0]  Yes    Anxiety [F41.9]  Yes    High cholesterol [E78.00]  Yes    GERD [K21.9]  Yes    Type 2 diabetes mellitus [E11.9]  Yes    Obesity, Class III, BMI 40-49.9 (morbid obesity) [E66.01]  Yes    Hypertension [I10]  Yes      Resolved Hospital Problems   No resolved problems to display.         ADDITIONAL PLAN:  - detailed assessment and plan from admission reviewed  - Anahy Schaffer is a 59 y.o. female with past medical history of diabetes mellitus type 2, hypertension, hyperlipidemia, anxiety, GERD who presents with syncope, transferred for NSTEMI. Strong family history of heart disease, cardiology planning [facility] today     NSTEMI  Near syncope with associated nausea and diaphoresis  Family Hx CAD   -Echo w/o valvular abnormalities or systolic dysfunction  -Cardiology planning [facility] today     Hypokalemia--resolved  -likely secondary to HCTZ use without potassium supplementation  -Received 40 mEq p.o. and 10 mEq IV at outside hospital  -CTM     Hyperlipidemia  -AM lipid panel shows LDL >70  -Continue statin     Diabetes mellitus type 2  -Hold home metformin  -Sliding scale insulin  -HgA1c is 6.2     Elevated LFTs  Fatty liver  -No prior labs in system for comparison, repeat CMP in a.m., will continue statin for now secondary to above     HTN  -Continue HCTZ for now.  Likely needs to be discharged with potassium supplement as well.     Obesity  -Complicates all aspects of care     Dental issue  -cont abx recently  prescribed by dentist    Expected Discharge   Expected Discharge Date: 12/21/2024; Expected Discharge Time:      Zoila Ross MD  12/20/24

## 2024-12-20 NOTE — CASE MANAGEMENT/SOCIAL WORK
Discharge Planning Assessment  [facility]     Patient Name: Anahy Schaffer  MRN: [id number]  Today's Date: 12/20/2024    Admit Date: 12/19/2024    Plan: Home   Discharge Needs Assessment       Row Name 12/20/24 1027       Living Environment    People in Home spouse    Name(s) of People in Home [place]      Row Name 12/20/24 1019       Living Environment    People in Home spouse;child(ronnie), dependent    Current Living Arrangements home    Potentially Unsafe Housing Conditions none    In the past 12 months has the electric, gas, oil, or water company threatened to shut off services in your home? No    Primary Care Provided by self    Family Caregiver if Needed none       Resource/Environmental Concerns    Resource/Environmental Concerns none    Transportation Concerns none       Transportation Needs    In the past 12 months, has lack of transportation kept you from medical appointments or from getting medications? no    In the past 12 months, has lack of transportation kept you from meetings, work, or from getting things needed for daily living? No       Food Insecurity    Within the past 12 months, you worried that your food would run out before you got the money to buy more. Never true    Within the past 12 months, the food you bought just didn't last and you didn't have money to get more. Never true       Transition Planning    Patient/Family Anticipates Transition to home with family    Patient/Family Anticipated Services at Transition none    Transportation Anticipated family or friend will provide       Discharge Needs Assessment    Readmission Within the Last 30 Days no previous admission in last 30 days    Equipment Currently Used at Home bp cuff;glucometer    Do you want help with school or training? For example, starting or completing job training or getting a high school diploma, GED or equivalent No    Anticipated Changes Related to Illness none    Equipment Needed After Discharge none                    Discharge Plan       Row Name 12/20/24 1021       Plan    Plan Home    Patient/Family in Agreement with Plan yes    Plan Comments Spoke with patient and family at bedside. Patient lives with  and Son in [facility]. She is independent with ADLs. 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 They are now off for winter break now. She doesn't use any DME. She not current with home health services. PCP is JOY Newby. Insurance conformed, [employer], [facility] [employer]. She gets her medication through [facility] on [address]. Patient plan is home with family. She has family that can transport her home. CM will follow for any discharge needs.    Final Discharge Disposition Code 01 - home or self-care                  Continued Care and Services - Admitted Since 12/19/2024    No active coordination exists for this encounter.       Expected Discharge Date and Time       Expected Discharge Date Expected Discharge Time    Dec 21, 2024            Demographic Summary       Row Name 12/20/24 1019       General Information    Preferred Language English                   Functional Status       Row Name 12/20/24 1019       Functional Status    Usual Activity Tolerance good    Current Activity Tolerance good       Functional Status, IADL    Medications independent    Meal Preparation independent    Housekeeping independent    Laundry independent    Shopping independent       Mental Status    General Appearance WDL WDL       Mental Status Summary    Recent Changes in Mental Status/Cognitive Functioning no changes                   Psychosocial    No documentation.                  Abuse/Neglect    No documentation.                  Legal    No documentation.                  Substance Abuse    No documentation.                  Patient Forms    No documentation.                     Clarisa Elena, RN
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
## 2024-12-20 NOTE — CONSULTS
"Diabetes Education    Patient Name:  Anahy Schaffer  YOB: 1965  MRN: [id number]  Admit Date:  12/19/2024    Consult for diabetes education received per MD order. Chart reviewed. Pt was seen at bedside today. Permission given for visit.     Patient's current A1C is 6.2%. Home medications consist of metformin. She does not recall how long she has had diabetes, in the chart it is listed as 2018. She states she does not continuously test her glucose daily, \"only test when I am feeling bad\". She states she gets a rash on her abdomen when her glucose is elevated. Reviewed target glucose goals of  fasting and  post prandial. Reviewed hypoglycemia signs and symptoms and treatment options as well as hyperglycemia signs and symptoms and treatment with hydration and exercise. Patient denies experiencing hypoglycemia. Reviewed the plate method, and discussed carbs, protein and fats and their effect on glucose levels. Patient had questions about starbucks sugar cookie drinks and [employer] 1/2 sweet tea. Encouraged water and limiting sweetened beverages to occasions. Encouraged she test her glucose more frequently when indulging in specific foods and especially around the holidays. Provided patient with [facility]'s \"Diabetes Basics\" booklet.    Total time spent reviewing chart, preparing education/materials, providing education at bedside, and coordinating care approx 30 minutes.    Thank you for this consult, re consult as needed.     Electronically signed by:  Lu Casey RN  12/20/24 15:51 EST  "
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
## 2024-12-20 NOTE — CONSULTS
Date of Hospital Visit: 24  Encounter Provider: Jimmy Alatorre MD  Place of Service: [facility]  Patient Name: Anahy Schaffer  :1965  Referral Provider: No ref. provider found  Primary Care Provider: Muna Ozuna APRN    Chief complaint/Reason for Consultation: Non-ST segment elevation MI      History of Present Illness:  Patient is a 59 years old with multiple risk factors was admitted yesterday from the ER after she starting having dizziness feeling like almost passing out and the back pain and was found to have increased troponin.    Patient denies any chest pain or other symptoms.  Patient does have multiple risk factors and episodes of feeling of passing out.  Patient denies any paroxysmal nocturnal dyspnea.  Patient denies any lower extremity edema.  Patient denies any other symptoms.      Problem List:  CARDIAC  Coronary Artery Disease:   NSTEMI, 2024: Left heart cath normal     Myocardium:   Echo, 2020: LVEF 60%      Valvular:   No known valvular disease     Electrical:   NSR     Pericardium:   Normal     AUTONOMIC DYSFUNCTION   Neurally mediated Syncope/vagal:  Syncope    CARDIAC RISK FACTORS  Hypertension  Diabetes  Dyslipidemia  Obesity  Physical Inactivity  Obstructive Sleep Apnea  Menopausal state - Post-menopausal    NON-CARDIAC  GERD  Anxiety  Elevated liver enzymes    SURGERIES   with tubal  Myomectomy  Bilateral breast reduction  Tonsillectomy            Medications Prior to Admission   Medication Sig Dispense Refill Last Dose/Taking    cetirizine (zyrTEC) 10 MG tablet Take 1 tablet by mouth Daily.   2024 Morning    fluticasone (FLONASE) 50 MCG/ACT nasal spray    Past Week    hydrochlorothiazide (HYDRODIURIL) 25 MG tablet hydrochlorothiazide 25 mg tablet   2024 Morning    metFORMIN (GLUCOPHAGE) 1000 MG tablet Take 1 tablet by mouth 2 (Two) Times a Day With Meals.   2024 Morning    Nutritional Supplements (ESTROVEN PO)  Take  by mouth.   12/19/2024 Morning    atorvastatin (LIPITOR) 10 MG tablet Take 1 tablet by mouth Daily.   12/18/2024 Bedtime    cholecalciferol (VITAMIN D3) 1000 units tablet Take 1 tablet by mouth Daily.       montelukast (SINGULAIR) 10 MG tablet Take 1 tablet by mouth Daily.   12/18/2024 Bedtime    Multiple Vitamins-Minerals (MULTIVITAMIN ADULT PO) Take 1 tablet by mouth Daily.   12/18/2024 Bedtime    Omega-3 Fatty Acids (fish oil) 1000 MG capsule capsule Take  by mouth.   12/18/2024 Bedtime    omeprazole (priLOSEC) 20 MG capsule Take 1 capsule by mouth Daily.   12/18/2024 Bedtime    sertraline (ZOLOFT) 50 MG tablet Take 1 tablet by mouth Daily.   12/18/2024 Bedtime    sodium-potassium-magnesium sulfates (Suprep Bowel Prep Kit) 17.5-3.13-1.6 GM/177ML solution oral solution Use as directed by provider for colonoscopy prep 354 mL 0 Unknown       Social History     Socioeconomic History    Marital status:    Tobacco Use    Smoking status: Never    Smokeless tobacco: Never   Vaping Use    Vaping status: Never Used   Substance and Sexual Activity    Alcohol use: No    Drug use: No    Sexual activity: Yes     Partners: Male     Birth control/protection: Surgical       Family History   Problem Relation Age of Onset    Hypertension Father     Diabetes Father     Kidney disease Mother     Kidney cancer Mother     Arthritis Mother     Hypertension Sister     Arthritis Sister     Breast cancer Maternal Cousin 28    Ovarian cancer Neg Hx     BRCA 1/2 Neg Hx        REVIEW OF SYSTEMS:   Review of Systems   Respiratory:  Positive for shortness of breath.    Neurological:  Positive for dizziness.             Objective:     Vitals:    12/20/24 0016 12/20/24 0019 12/20/24 0114 12/20/24 0248   BP: 174/91  151/76 147/88   BP Location:   Left arm Left arm   Patient Position:   Lying Lying   Pulse: 76 69 76 74   Resp:   19 18   Temp:   97.7 °F (36.5 °C) 97.6 °F (36.4 °C)   TempSrc:   Oral Oral   SpO2: 91%  94% 95%   Weight:     "   Height:           Body mass index is 46.67 kg/m².  Flowsheet Rows      Flowsheet Row First Filed Value   Admission Height 170.2 cm (67\") Documented at 12/19/2024 1935   Admission Weight 135 kg (298 lb) Documented at 12/19/2024 1935            Physical Exam     Constitutional:       General: Not in acute distress.     Appearance: Healthy appearance. Not in distress.     Neck:     JVP:Not elevated     Carotid artery: Normal    Pulmonary:      Effort: Pulmonary effort is normal.      Breath sounds: Normal breath sounds. No wheezing. No rhonchi. No rales.     Cardiovascular:      Normal rate. Regular rhythm. Normal S1. Normal S2.      Murmurs: There is no significant murmur.      No gallop. No click. No rub.     Abdominal:      General: Bowel sounds are normal.      Palpations: Abdomen is soft.      Tenderness: There is no abdominal tenderness.    Extremities:     Pulses:Normal radial and pedal pulses     Edema:no edema        Lab Review:                Results from last 7 days   Lab Units 12/20/24  0446   SODIUM mmol/L 141   POTASSIUM mmol/L 3.7   CHLORIDE mmol/L 103   CO2 mmol/L 25.0   BUN mg/dL 12   CREATININE mg/dL 0.64   GLUCOSE mg/dL 131*   CALCIUM mg/dL 9.5     Results from last 7 days   Lab Units 12/20/24  0446 12/19/24  2146 12/19/24 2029   HSTROP T ng/L 14* 24* 14*     Results from last 7 days   Lab Units 12/20/24  0446   WBC 10*3/mm3 14.49*   HEMOGLOBIN g/dL 13.3   HEMATOCRIT % 40.2   PLATELETS 10*3/mm3 215         Results from last 7 days   Lab Units 12/20/24  0446   MAGNESIUM mg/dL 2.1     Results from last 7 days   Lab Units 12/20/24  0446   CHOLESTEROL mg/dL 154   TRIGLYCERIDES mg/dL 88   HDL CHOL mg/dL 60   LDL CHOL mg/dL 78       EKG: Normal sinus rhythm with no acute changes        HEART Score: 5 (12/19/2024 11:52 PM)         Assessment:   Non-STEMI with possible type II  Hypertension  Hyperlipidemia  Diabetes      Plan:   Patient has normal left heart catheterization.  Patient will benefit from " regular dose of aspirin.  Will continue patient on Lipitor.  Patient might benefit from sleep study.  Patient should be able to go home.

## 2024-12-21 NOTE — DISCHARGE SUMMARY
[facility] Medicine Services  DISCHARGE SUMMARY    Patient Name: Anahy Schaffer  : 1965  MRN: [id number]    Date of Admission: 2024  7:32 PM  Date of Discharge:  2024  Primary Care Physician: Muna Ozuna APRN    Consults       Date and Time Order Name Status Description    2024  1:16 AM Inpatient Cardiology Consult Completed             Hospital Course     Presenting Problem: syncope    Active Hospital Problems    Diagnosis  POA    Elevated liver enzymes [R74.8]  Yes    Fatty liver [K76.0]  Yes    Anxiety [F41.9]  Yes    High cholesterol [E78.00]  Yes    GERD [K21.9]  Yes    Type 2 diabetes mellitus [E11.9]  Yes    Obesity, Class III, BMI 40-49.9 (morbid obesity) [E66.01]  Yes    Hypertension [I10]  Yes      Resolved Hospital Problems    Diagnosis Date Resolved POA    **NSTEMI (non-ST elevated myocardial infarction) [I21.4] 2024 Yes          Hospital Course:  Anahy Schaffer is a 59 y.o. female with past medical history of diabetes mellitus type 2, hypertension, hyperlipidemia, anxiety, GERD who presents with syncope, transferred for NSTEMI. Strong family history of heart disease, cardiology performed [facility] .  Heart cath was normal.  Cardiology recommended regular dose of aspirin, continuing Lipitor.  She will benefit from a sleep study, this can be arranged with her PCP.  Cleared from cardiology standpoint for discharge exam  Additionally she was found to have hypokalemia, likely secondary to HCTZ use without potassium supplementation.  She will be discharged on potassium supplementation, to follow-up with PCP      Discharge Follow Up Recommendations for outpatient labs/diagnostics:   PCP    Day of Discharge     HPI:   Resting comfortably, seen prior to cath    Review of Systems  Gen- No fevers, chills  CV- No chest pain, palpitations  Resp- No cough, dyspnea  GI- No N/V/D, abd pain      Vital Signs:          Physical  Exam:  Constitutional: No acute distress, awake, alert  HENT: NCAT, mucous membranes moist  Respiratory: Clear to auscultation bilaterally, respiratory effort normal   Cardiovascular: RRR, no murmurs, rubs, or gallops  Gastrointestinal: Positive bowel sounds, soft, nontender, nondistended  Musculoskeletal: No bilateral ankle edema  Psychiatric: Appropriate affect, cooperative  Neurologic: Oriented x 3, strength symmetric in all extremities, Cranial Nerves grossly intact to confrontation, speech clear  Skin: No rashes      Pertinent  and/or Most Recent Results     LAB RESULTS:      Lab 12/20/24 0446 12/19/24 2029   WBC 14.49* 11.71*   HEMOGLOBIN 13.3 13.9   HEMATOCRIT 40.2 41.8   PLATELETS 215 221   NEUTROS ABS  --  9.36*   IMMATURE GRANS (ABS)  --  0.29*   LYMPHS ABS  --  1.78   MONOS ABS  --  0.21   EOS ABS  --  0.06   MCV 92.4 90.9         Lab 12/20/24 0446 12/19/24 2029   SODIUM 141 136   POTASSIUM 3.7 2.7*   CHLORIDE 103 99   CO2 25.0 23.5   ANION GAP 13.0 13.5   BUN 12 13   CREATININE 0.64 0.86   EGFR 101.9 77.9   GLUCOSE 131* 227*   CALCIUM 9.5 9.8   MAGNESIUM 2.1 1.9   HEMOGLOBIN A1C 6.20*  --    TSH 3.370  --          Lab 12/20/24 0446 12/19/24 2029   TOTAL PROTEIN 6.9 7.5   ALBUMIN 4.1 4.5   GLOBULIN 2.8 3.0   ALT (SGPT) 51* 56*   AST (SGOT) 47* 62*   BILIRUBIN 0.6 0.8   ALK PHOS 99 112         Lab 12/20/24 0446 12/19/24  2146 12/19/24 2029   HSTROP T 14* 24* 14*         Lab 12/20/24 0446   CHOLESTEROL 154   LDL CHOL 78   HDL CHOL 60   TRIGLYCERIDES 88             Brief Urine Lab Results  (Last result in the past 365 days)        Color   Clarity   Blood   Leuk Est   Nitrite   Protein   CREAT   Urine HCG        12/19/24 2053 Yellow   Cloudy   Negative   Small (1+)   Negative   100 mg/dL (2+)                 Microbiology Results (last 10 days)       ** No results found for the last 240 hours. **            Cardiac Catheterization/Vascular Study    Result Date: 12/20/2024    Mild coronary artery  disease Medical management     CT Angiogram Chest    Result Date: 12/19/2024  CT ANGIOGRAM CHEST Date of Exam: 12/19/2024 9:18 PM EST Indication: back pain, near syncope. Comparison: None available. Technique: CTA of the chest was performed after the uneventful intravenous administration of 100 cc Isovue 370 . Reconstructed coronal and sagittal images were also obtained. In addition, a 3-D volume rendered image was created for interpretation. Automated exposure control and iterative reconstruction methods were used. Findings: No coronary artery calcification. Thoracic aorta is of normal caliber. No evidence of aortic dissection. Pulmonary arteries are well-opacified. No evidence of pulmonary embolus. No mediastinal, hilar, or axillary adenopathy. There are no pleural effusions. There is minimal atelectasis within the lingula. Lungs are otherwise clear. Bilateral adrenal glands are within normal limits. No lytic or sclerotic bony lesions are seen.     Impression: 1. No evidence pulmonary embolus or other acute process in the chest. Electronically Signed: Abdelrahman Guo MD  12/19/2024 9:37 PM EST  Workstation ID: [id number]    XR Chest 2 View    Result Date: 12/19/2024  XR CHEST 2 VW Date of Exam: 12/19/2024 8:02 PM EST Indication: near syncope Comparison: 2012 Findings: Unremarkable cardiomediastinal silhouette. Perihilar peribronchial cuffing. No focal airspace opacity, pleural effusion, or pneumothorax. No acute bone abnormality.     Impression: Perihilar peribronchial cuffing, nonspecific but can be seen with viral illness and reactive airways disease. Otherwise no acute cardiopulmonary abnormality. Electronically Signed: Rambo Crenshaw MD  12/19/2024 8:23 PM EST  Workstation ID: [id number]    CT Head Without Contrast    Result Date: 12/19/2024  CT HEAD WO CONTRAST Date of Exam: 12/19/2024 8:02 PM EST Indication: near syncope, dizziness. Comparison: None available. Technique: Axial CT images were obtained of the head  without contrast administration.  Automated exposure control and iterative construction methods were used. Findings: There is no evidence of acute intracranial hemorrhage, mass, midline shift, loss of gray-white matter differentiation, or extra-axial fluid collection.  There is normal ventricular volume. The basal cisterns are patent. No evidence of fracture. There is mild mucosal thickening of the left maxillary sinus. Mastoid air cells are clear. The orbits and included soft tissues show no acute abnormality. Dental amalgam.      Impression: No acute intracranial abnormality. Electronically Signed: Rambo Crenshaw MD  12/19/2024 8:10 PM EST  Workstation ID: [id number]             Results for orders placed during the hospital encounter of 12/19/24    Adult Transthoracic Echo Complete W/ Cont if Necessary Per Protocol    Interpretation Summary    Left ventricular systolic function is normal. Calculated left ventricular EF = 57.8% Left ventricular ejection fraction appears to be 56 - 60%.    Left ventricular wall thickness is consistent with mild concentric hypertrophy.    Left ventricular diastolic function is consistent with (grade I) impaired relaxation.    Normal left atrial size and volume noted.      Plan for Follow-up of Pending Labs/Results: no pending results    Discharge Details        Discharge Medications        New Medications        Instructions Start Date   aspirin 81 MG EC tablet   81 mg, Oral, Daily      potassium chloride 10 MEQ CR capsule  Commonly known as: MICRO-K   20 mEq, Oral, Daily             Continue These Medications        Instructions Start Date   atorvastatin 10 MG tablet  Commonly known as: LIPITOR   10 mg, Oral, Daily      cetirizine 10 MG tablet  Commonly known as: zyrTEC   1 tablet, Daily      cholecalciferol 25 MCG (1000 UT) tablet  Commonly known as: VITAMIN D3   1,000 Units, Oral, Daily      ESTROVEN PO   Take  by mouth.      fish oil 1000 MG capsule capsule   Oral      fluticasone 50  MCG/ACT nasal spray  Commonly known as: FLONASE   No dose, route, or frequency recorded.      hydroCHLOROthiazide 25 MG tablet   hydrochlorothiazide 25 mg tablet      metFORMIN 1000 MG tablet  Commonly known as: GLUCOPHAGE   1,000 mg, 2 Times Daily With Meals      montelukast 10 MG tablet  Commonly known as: SINGULAIR   1 tablet, Oral, Daily      multivitamin with minerals tablet tablet   1 tablet, Oral, Daily      omeprazole 20 MG capsule  Commonly known as: priLOSEC   20 mg, Oral, Daily      sertraline 50 MG tablet  Commonly known as: ZOLOFT   50 mg, Oral, Daily      sodium-potassium-magnesium sulfates 17.5-3.13-1.6 GM/177ML solution oral solution  Commonly known as: Suprep Bowel Prep Kit   Use as directed by provider for colonoscopy prep               Allergies   Allergen Reactions    Codeine Nausea And Vomiting    Lortab [Hydrocodone-Acetaminophen] Nausea And Vomiting    Sulfa Antibiotics Nausea And Vomiting         Discharge Disposition:  Home or Self Care    Diet:  Hospital:No active diet order           Activity:  as tolerated    Restrictions or Other Recommendations:  none       CODE STATUS:    Code Status and Medical Interventions: CPR (Attempt to Resuscitate); Full Support   Ordered at: 12/20/24 0116     Level Of Support Discussed With:    Patient     Code Status (Patient has no pulse and is not breathing):    CPR (Attempt to Resuscitate)     Medical Interventions (Patient has pulse or is breathing):    Full Support       Future Appointments   Date Time Provider Department Center   12/27/2024  8:20 AM Usama Mosqueda MD [facility]                 Zoila Ross MD  12/21/24      Time Spent on Discharge:  I spent  45  minutes on this discharge activity which included: face-to-face encounter with the patient, reviewing the data in the system, coordination of the care with the nursing staff as well as consultants, documentation, and entering orders.

## 2024-12-27 ENCOUNTER — OFFICE VISIT (OUTPATIENT)
Dept: OBSTETRICS AND GYNECOLOGY | Facility: CLINIC | Age: 59
End: 2024-12-27
Payer: COMMERCIAL

## 2024-12-27 VITALS
WEIGHT: 293 LBS | RESPIRATION RATE: 14 BRPM | SYSTOLIC BLOOD PRESSURE: 136 MMHG | DIASTOLIC BLOOD PRESSURE: 82 MMHG | BODY MASS INDEX: 46.35 KG/M2

## 2024-12-27 DIAGNOSIS — Z01.419 WELL WOMAN EXAM: Primary | ICD-10-CM

## 2024-12-27 DIAGNOSIS — Z71.85 VACCINE COUNSELING: ICD-10-CM

## 2024-12-27 PROCEDURE — 99396 PREV VISIT EST AGE 40-64: CPT | Performed by: OBSTETRICS & GYNECOLOGY

## 2024-12-27 NOTE — PROGRESS NOTES
Subjective   Chief Complaint   Patient presents with    Gynecologic Exam     Anahy Schaffer is a 59 y.o. year old  menopausal female presenting to be seen for her annual exam.      This past year she has not been on hormone replacement therapy.  She has not had any vaginal bleeding in the last 12 months.  Menopausal symptoms are present (hot flashes) but not affecting her quality of life .    SEXUAL Hx:  She is currently sexually active.  In the past year there there has been NO new sexual partners.    Condoms are never used.  She would not like to be screened for STD's at today's exam.  [place] is painful: no  HEALTH Hx:  She exercises regularly: no (but is planning to start exercising more).  She wears her seat belt: yes.  She has concerns about domestic violence: no.  She has noticed changes in height: no.    The following portions of the patient's history were reviewed and updated as appropriate:problem list, current medications, allergies, past family history, past medical history, past social history, and past surgical history.    Social History    Tobacco Use      Smoking status: Never      Smokeless tobacco: Never      Review of Systems  Constitutional POS: nothing reported    NEG: anorexia or night sweats   Genitourinary POS: PAULINA is present but it IS NOT effecting her ADL's    NEG: dysuria or hematuria      Gastointestinal POS: nothing reported    NEG: bloating, change in bowel habits, melena, or reflux symptoms   Integument POS: nothing reported and she sees her dermatologist for routine skins exams    NEG: moles that are changing in size, shape, color or rashes   Breast POS: nothing reported    NEG: persistent breast lump, skin dimpling, or nipple discharge        Objective   /82   Resp 14   Wt 134 kg (296 lb)   LMP 2018 (Approximate)   BMI 46.35 kg/m²     General:  well developed; well nourished  no acute distress  mentation appropriate   Skin:  No suspicious lesions seen    Thyroid: normal to inspection and palpation   Breasts:  Examined in supine position  Symmetric without masses or skin dimpling  Nipples normal without inversion, lesions or discharge  There are no palpable axillary nodes   Abdomen: soft, non-tender; no masses  no umbilical or inguinal hernias are present  no hepato-splenomegaly   Pelvis: Exam limited by  body habitus  Clinical staff was present for exam  External genitalia:  normal appearance of the external genitalia including Bartholin's and [place]'s glands.  :  urethral meatus normal;  Vaginal:  normal pink mucosa without prolapse or lesions.  Cervix:  normal appearance.  Uterus:  not palpable.  Adnexa:  non palpable bilaterally.  Rectal:  digital rectal exam not performed; anus visually normal appearing.        Assessment   Normal GYN exam in menopause limited by weight  Menopausal female currently not on HRT - without significant symptoms affecting activities of daily living  She is up to date on all relevant gynecologic and colorectal screenings       Plan   Pap was not done today.  I explained to Anahy that the recommendations for Pap smear interval in a low risk patient has lengthened to 3 years time.  I told Anahy she still needs to be seen in our office yearly for a full physical including breast and pelvic exam.  She was encouraged to get yearly mammograms.  She should report any palpable breast lump(s) or skin changes regardless of mammographic findings.  I explained to Anahy that notification regarding her mammogram results will come from the center performing the study.  Our office will not be routinely calling with mammogram results.  It is her responsibility to make sure that the results from the mammogram are communicated to her by the breast center.  If she has any questions about the results, she is welcome to call our office anytime.  I have counseled the patient on the importance of staying up to date with preventive vaccines as well  as the risks and benefits of these vaccines.  Her vaccine record was reviewed and updated.  The importance of keeping all planned follow-up and taking all medications as prescribed was emphasized.  Follow up for annual exam 1 year           This note was electronically signed.    Usama Mosqueda M.D.  December 27, 2024    Part of this note may be an electronic transcription/translation of spoken language to printed text using the Dragon Dictation System.

## 2025-01-06 LAB
QT INTERVAL: 414 MS
QT INTERVAL: 418 MS
QTC INTERVAL: 457 MS
QTC INTERVAL: 459 MS

## 2025-01-08 ENCOUNTER — OFFICE VISIT (OUTPATIENT)
Dept: CARDIOLOGY | Facility: CLINIC | Age: 60
End: 2025-01-08
Payer: COMMERCIAL

## 2025-01-08 VITALS
HEART RATE: 89 BPM | DIASTOLIC BLOOD PRESSURE: 82 MMHG | SYSTOLIC BLOOD PRESSURE: 132 MMHG | HEIGHT: 67 IN | OXYGEN SATURATION: 98 % | BODY MASS INDEX: 45.99 KG/M2 | WEIGHT: 293 LBS

## 2025-01-08 DIAGNOSIS — I25.10 CORONARY ARTERY DISEASE INVOLVING NATIVE CORONARY ARTERY OF NATIVE HEART WITHOUT ANGINA PECTORIS: Primary | Chronic | ICD-10-CM

## 2025-01-08 DIAGNOSIS — E78.2 MIXED HYPERLIPIDEMIA: Chronic | ICD-10-CM

## 2025-01-08 DIAGNOSIS — I21.4 NON-ST ELEVATION MI (NSTEMI): Chronic | ICD-10-CM

## 2025-01-08 DIAGNOSIS — I10 PRIMARY HYPERTENSION: Chronic | ICD-10-CM

## 2025-01-08 RX ORDER — CARVEDILOL 6.25 MG/1
6.25 TABLET ORAL 2 TIMES DAILY
Qty: 180 TABLET | Refills: 3 | Status: SHIPPED | OUTPATIENT
Start: 2025-01-08

## 2025-01-08 RX ORDER — ATORVASTATIN CALCIUM 40 MG/1
40 TABLET, FILM COATED ORAL DAILY
Qty: 30 TABLET | Refills: 3 | Status: SHIPPED | OUTPATIENT
Start: 2025-01-08

## 2025-01-08 RX ORDER — CLOPIDOGREL BISULFATE 75 MG/1
75 TABLET ORAL DAILY
Qty: 90 TABLET | Refills: 3 | Status: SHIPPED | OUTPATIENT
Start: 2025-01-08

## 2025-01-08 NOTE — ASSESSMENT & PLAN NOTE
Symptoms were not classically anginal, ER ECGs did not appear ischemic, and only serologic abnormality was mild troponin elevation.  Only significant prehospital symptom was exertional dyspnea which has resolved.  Coronary angiography showed mild CAD but no obstructive disease.  No PCI.  Echocardiogram shows normal EF with some diastolic dysfunction and mild LVH.  Possible she may have had spasm, hypertensive crisis, or recanalization of a small thrombus.  Extent of myocardial damage is likely small considering very low troponin elevation and no evidence of regional wall motion abnormality on echo.   --Will go ahead and maximize medical therapy for CAD.  Will maximize atorvastatin to 40 and add clopidogrel for at least 6 to 12 months  --Placing referral to cardiac rehab  -- Will focus on blood pressure control by switching to carvedilol as below    Orders:    atorvastatin (LIPITOR) 40 MG tablet; Take 1 tablet by mouth Daily.    carvedilol (COREG) 6.25 MG tablet; Take 1 tablet by mouth 2 (Two) Times a Day.    clopidogrel (PLAVIX) 75 MG tablet; Take 1 tablet by mouth Daily.    Ambulatory Referral to Cardiac Rehab

## 2025-01-08 NOTE — ASSESSMENT & PLAN NOTE
Blood pressure is borderline.  Goal less than 130/80.  She did have some severe hypokalemia on presentation but likely exacerbated by hydrochlorothiazide  -- Will stop the HCTZ and potassium  -- Start carvedilol 6.25 twice daily.  Target blood pressure as above.  Call back if not at goal within 2 weeks.

## 2025-01-08 NOTE — ASSESSMENT & PLAN NOTE
Tolerating statin without myalgias.  LDL goal less than 70 in the setting of CAD and prediabetes  -- Increasing atorvastatin to high intensity 40 mg dose.  Plan repeat lipids next visit
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
## 2025-01-09 ENCOUNTER — DOCUMENTATION (OUTPATIENT)
Dept: CARDIAC REHAB | Facility: HOSPITAL | Age: 60
End: 2025-01-09
Payer: COMMERCIAL

## 2025-01-09 NOTE — PROGRESS NOTES
Referral received for Phase II Cardiac Rehab.  Staff has reviewed chart and patient does not have a qualifying diagnosis for Phase II Cardiac Rehab at this time due to her diagnosis of NSTEMI II.  Staff available if further consultation is needed.

## 2025-04-16 ENCOUNTER — OFFICE VISIT (OUTPATIENT)
Dept: CARDIOLOGY | Facility: CLINIC | Age: 60
End: 2025-04-16
Payer: COMMERCIAL

## 2025-04-16 ENCOUNTER — LAB (OUTPATIENT)
Facility: HOSPITAL | Age: 60
End: 2025-04-16
Payer: COMMERCIAL

## 2025-04-16 VITALS
HEART RATE: 60 BPM | DIASTOLIC BLOOD PRESSURE: 88 MMHG | OXYGEN SATURATION: 98 % | SYSTOLIC BLOOD PRESSURE: 130 MMHG | WEIGHT: 275 LBS | HEIGHT: 67 IN | BODY MASS INDEX: 43.16 KG/M2

## 2025-04-16 DIAGNOSIS — I25.10 CORONARY ARTERY DISEASE INVOLVING NATIVE CORONARY ARTERY OF NATIVE HEART WITHOUT ANGINA PECTORIS: Primary | Chronic | ICD-10-CM

## 2025-04-16 DIAGNOSIS — E66.01 OBESITY, CLASS III, BMI 40-49.9 (MORBID OBESITY): ICD-10-CM

## 2025-04-16 DIAGNOSIS — E78.2 MIXED HYPERLIPIDEMIA: Chronic | ICD-10-CM

## 2025-04-16 DIAGNOSIS — I10 PRIMARY HYPERTENSION: Chronic | ICD-10-CM

## 2025-04-16 DIAGNOSIS — I25.10 CORONARY ARTERY DISEASE INVOLVING NATIVE CORONARY ARTERY OF NATIVE HEART WITHOUT ANGINA PECTORIS: Chronic | ICD-10-CM

## 2025-04-16 PROCEDURE — 36415 COLL VENOUS BLD VENIPUNCTURE: CPT

## 2025-04-16 PROCEDURE — 80061 LIPID PANEL: CPT

## 2025-04-16 RX ORDER — LEVOCETIRIZINE DIHYDROCHLORIDE 5 MG/1
1 TABLET, FILM COATED ORAL DAILY
COMMUNITY
Start: 2025-04-08

## 2025-04-16 RX ORDER — TIRZEPATIDE 2.5 MG/.5ML
2.5 INJECTION, SOLUTION SUBCUTANEOUS WEEKLY
COMMUNITY
Start: 2025-03-09

## 2025-04-16 NOTE — ASSESSMENT & PLAN NOTE
Stable and asymptomatic from this standpoint.  No bleeding on DAPT.  Exam is normal.  Continue DAPT through December 2025  Continue beta-blocker and statin  Encouraged to pursue regular aerobic exercise and weight loss    Orders:    Lipid Panel; Future    ECG 12 Lead

## 2025-04-16 NOTE — ASSESSMENT & PLAN NOTE
Patient has lost weight on Zepbound.  Advised that this is an okay short-term strategy but I would rather that she implement more sustainable lifestyle modification to maintain a healthy weight and overall cardiovascular health for the long-term.  Patient is agreement with this and will continue to monitor her response to GLP-1 receptor agonist  with primary care.

## 2025-04-16 NOTE — PROGRESS NOTES
[facility] Cardiology Office Follow Up Note    Anahy Schaffer  [id number]  2025    Primary Care Provider: Muna Ozuna APRN    Chief Complaint   Patient presents with    Coronary Artery Disease     3 month follow up       Subjective     History of Present Illness:   Anahy Schaffer is a 59 y.o. female with nonobstructive CAD, hypertension, sleep apnea, hyperlipidemia who presents to the Cardiology Clinic for follow-up.  During her last visit, we increased atorvastatin and added clopidogrel to her regimen.  Patient says she feels a lot better.  She was diagnosed with sleep apnea and has been compliant with CPAP and has much more energy with this on board.  Blood pressures have been running much better than before.  She has no chest pain or dyspnea with exertion.  She was also started on Zepbound for weight loss and is making progress but has her reservations for the long-term.    Past Cardiac History and Testin. Coronary Artery Disease  -- NSTEMI, 2024  -- Angio : Mild CAD  -- Echo : EF 58%, mild LVH, grade 1 DD    2.  Syncope/presyncope in the setting of elevated troponin    3. Hypertension  4. Diabetes  5. Dyslipidemia  6. Obstructive Sleep Apnea      Review of Systems:  Review of Systems   Constitutional: Negative.    Respiratory: Negative.     Cardiovascular: Negative.    Gastrointestinal: Negative.    Musculoskeletal: Negative.    Neurological: Negative.        I have reviewed and/or updated the patient's past medical, past surgical, family, social history, problem list and allergies as appropriate.       Current Outpatient Medications:     aspirin 81 MG EC tablet, Take 1 tablet by mouth Daily., Disp: 30 tablet, Rfl: 0    atorvastatin (LIPITOR) 40 MG tablet, Take 1 tablet by mouth Daily., Disp: 30 tablet, Rfl: 3    carvedilol (COREG) 6.25 MG tablet, Take 1 tablet by mouth 2 (Two) Times a Day., Disp: 180 tablet, Rfl: 3    cholecalciferol (VITAMIN D3)  "1000 units tablet, Take 1 tablet by mouth Daily., Disp: , Rfl:     clopidogrel (PLAVIX) 75 MG tablet, Take 1 tablet by mouth Daily., Disp: 90 tablet, Rfl: 3    fluticasone (FLONASE) 50 MCG/ACT nasal spray, 1 spray Daily., Disp: , Rfl:     levocetirizine (XYZAL) 5 MG tablet, Take 1 tablet by mouth Daily., Disp: , Rfl:     metFORMIN (GLUCOPHAGE) 1000 MG tablet, Take 0.5 tablets by mouth 2 (Two) Times a Day With Meals., Disp: , Rfl:     montelukast (SINGULAIR) 10 MG tablet, Take 1 tablet by mouth Daily., Disp: , Rfl:     Multiple Vitamins-Minerals (MULTIVITAMIN ADULT PO), Take 1 tablet by mouth Daily., Disp: , Rfl:     Nutritional Supplements (ESTROVEN PO), Take  by mouth Daily. OTC, Disp: , Rfl:     Omega-3 Fatty Acids (fish oil) 1000 MG capsule capsule, Take 1 capsule by mouth Daily With Breakfast., Disp: , Rfl:     omeprazole (priLOSEC) 20 MG capsule, Take 1 capsule by mouth Daily., Disp: , Rfl:     sertraline (ZOLOFT) 50 MG tablet, Take 1 tablet by mouth Daily., Disp: , Rfl:     Zepbound 2.5 MG/0.5ML solution auto-injector, 0.5 mL 1 (One) Time Per Week., Disp: , Rfl:        Objective     Vitals:  Vitals:    04/16/25 1522   BP: 130/88   BP Location: Left arm   Patient Position: Sitting   Cuff Size: Large Adult   Pulse: 60   SpO2: 98%   Weight: 125 kg (275 lb)   Height: 170.2 cm (67\")       Physical Exam:  Vitals reviewed.   Constitutional:       Appearance: Healthy appearance. Not in distress.   Cardiovascular:      Normal rate. Regular rhythm. Normal S1. Normal S2.       Murmurs: There is no murmur.      No gallop.  No click. No rub.   Pulses:     Intact distal pulses.   Edema:     Peripheral edema absent.   Skin:     General: Skin is warm and dry.         Results Review:   I reviewed the patient's new clinical results.  I reviewed the patient's new imaging results and agree with the interpretation.  I reviewed the patient's other test results and agree with the interpretation      ECG 12 Lead    Date/Time: " 4/17/2025 12:26 PM  Performed by: Edgardo Simpson MD    Authorized by: Edgardo Simpson MD  Comparison: compared with previous ECG from 12/20/2024  Similar to previous ECG  Rhythm: sinus bradycardia  Rate: bradycardic  BPM: 57  Conduction: 1st degree AV block  ST Segments: ST segments normal  T Waves: T waves normal  QRS axis: normal  Other: no other findings  Clinical impression comment: Borderline ECG          Most Recent Labs:  none          Assessment & Plan  Coronary artery disease involving native coronary artery of native heart without angina pectoris  Stable and asymptomatic from this standpoint.  No bleeding on DAPT.  Exam is normal.  Continue DAPT through December 2025  Continue beta-blocker and statin  Encouraged to pursue regular aerobic exercise and weight loss    Orders:    Lipid Panel; Future    ECG 12 Lead    Primary hypertension  Borderline today.  Goal less than 130/80  Continue current dose of carvedilol  Continue compliance with CPAP nightly         Mixed hyperlipidemia  Tolerating statin without myalgias  Continue atorvastatin 40  Checking lipids         Obesity, Class III, BMI 40-49.9 (morbid obesity)  Patient has lost weight on Zepbound.  Advised that this is an okay short-term strategy but I would rather that she implement more sustainable lifestyle modification to maintain a healthy weight and overall cardiovascular health for the long-term.  Patient is agreement with this and will continue to monitor her response to GLP-1 receptor agonist  with primary care.              Plan   Preventative Cardiology:   Tobacco Cessation: N/A  Obstructive Sleep Apnea Screening: Completed  AAA Screening: Deffered  Peripheral Arterial Disease Screening: Deffered           Follow Up:   Return in about 8 months (around 12/16/2025).    Thank you for allowing me to participate in the care of your patient. Please do not hesitate to contact me with additional questions or concerns.     Electronically signed by Edgardo  MD Tatiana, 04/17/25, 12:30 PM EDT.

## 2025-04-16 NOTE — ASSESSMENT & PLAN NOTE
Borderline today.  Goal less than 130/80  Continue current dose of carvedilol  Continue compliance with CPAP nightly

## 2025-04-17 LAB
CHOLEST SERPL-MCNC: 119 MG/DL (ref 0–200)
HDLC SERPL-MCNC: 52 MG/DL (ref 40–60)
LDLC SERPL CALC-MCNC: 48 MG/DL (ref 0–100)
LDLC/HDLC SERPL: 0.88 {RATIO}
TRIGL SERPL-MCNC: 106 MG/DL (ref 0–150)
VLDLC SERPL-MCNC: 19 MG/DL (ref 5–40)

## 2025-05-05 DIAGNOSIS — I25.10 CORONARY ARTERY DISEASE INVOLVING NATIVE CORONARY ARTERY OF NATIVE HEART WITHOUT ANGINA PECTORIS: Chronic | ICD-10-CM

## 2025-05-05 RX ORDER — ATORVASTATIN CALCIUM 40 MG/1
40 TABLET, FILM COATED ORAL DAILY
Qty: 90 TABLET | Refills: 3 | Status: SHIPPED | OUTPATIENT
Start: 2025-05-05

## 2025-06-03 ENCOUNTER — TRANSCRIBE ORDERS (OUTPATIENT)
Dept: ADMINISTRATIVE | Facility: HOSPITAL | Age: 60
End: 2025-06-03
Payer: COMMERCIAL

## 2025-06-03 DIAGNOSIS — Z12.31 SCREENING MAMMOGRAM FOR BREAST CANCER: Primary | ICD-10-CM

## 2025-06-18 LAB
NCCN CRITERIA FLAG: NORMAL
TYRER CUZICK SCORE: 12.5

## 2025-06-23 ENCOUNTER — HOSPITAL ENCOUNTER (OUTPATIENT)
Dept: MAMMOGRAPHY | Facility: HOSPITAL | Age: 60
Discharge: HOME | End: 2025-06-23
Admitting: OBSTETRICS & GYNECOLOGY
Payer: COMMERCIAL

## 2025-06-23 DIAGNOSIS — Z12.31 SCREENING MAMMOGRAM FOR BREAST CANCER: ICD-10-CM

## 2025-06-23 PROCEDURE — 77063 BREAST TOMOSYNTHESIS BI: CPT

## 2025-06-23 PROCEDURE — 77067 SCR MAMMO BI INCL CAD: CPT

## (undated) DEVICE — MODEL BT2000 P/N 700287-012KIT CONTENTS: MANIFOLD WITH SALINE AND CONTRAST PORTS, SALINE TUBING WITH SPIKE AND HAND SYRINGE, TRANSDUCER: Brand: BT2000 AUTOMATED MANIFOLD KIT

## (undated) DEVICE — GW PERIPH GUIDERIGHT STD/EXCHNG/J/TIP SS 0.035IN 5X260CM

## (undated) DEVICE — CATH DIAG EXPO .045 FL3.5 5F 100CM

## (undated) DEVICE — GLIDESHEATH SLENDER STAINLESS STEEL KIT: Brand: GLIDESHEATH SLENDER

## (undated) DEVICE — MODEL AT P65, P/N 701554-001KIT CONTENTS: HAND CONTROLLER, 3-WAY HIGH-PRESSURE STOPCOCK WITH ROTATING END AND PREMIUM HIGH-PRESSURE TUBING: Brand: ANGIOTOUCH® KIT

## (undated) DEVICE — PK CATH CARD 10

## (undated) DEVICE — TR BAND RADIAL ARTERY COMPRESSION DEVICE: Brand: TR BAND

## (undated) DEVICE — CATH DIAG EXPO M/ PK 5F FL4/FR4 PIG

## (undated) DEVICE — SYS PANNUS RETENT LF